# Patient Record
Sex: FEMALE | Race: WHITE | Employment: FULL TIME | URBAN - METROPOLITAN AREA
[De-identification: names, ages, dates, MRNs, and addresses within clinical notes are randomized per-mention and may not be internally consistent; named-entity substitution may affect disease eponyms.]

---

## 2017-01-20 ENCOUNTER — TRANSFERRED RECORDS (OUTPATIENT)
Dept: HEALTH INFORMATION MANAGEMENT | Facility: CLINIC | Age: 52
End: 2017-01-20

## 2017-03-17 ENCOUNTER — TRANSFERRED RECORDS (OUTPATIENT)
Dept: HEALTH INFORMATION MANAGEMENT | Facility: CLINIC | Age: 52
End: 2017-03-17

## 2017-04-19 ENCOUNTER — TRANSFERRED RECORDS (OUTPATIENT)
Dept: HEALTH INFORMATION MANAGEMENT | Facility: CLINIC | Age: 52
End: 2017-04-19

## 2017-05-18 ENCOUNTER — OFFICE VISIT (OUTPATIENT)
Dept: URGENT CARE | Facility: URGENT CARE | Age: 52
End: 2017-05-18
Payer: COMMERCIAL

## 2017-05-18 VITALS
TEMPERATURE: 98.2 F | SYSTOLIC BLOOD PRESSURE: 165 MMHG | WEIGHT: 217.4 LBS | DIASTOLIC BLOOD PRESSURE: 104 MMHG | HEART RATE: 87 BPM

## 2017-05-18 DIAGNOSIS — K04.7 DENTAL ABSCESS: Primary | ICD-10-CM

## 2017-05-18 DIAGNOSIS — K08.89 PAIN, DENTAL: ICD-10-CM

## 2017-05-18 PROCEDURE — 99213 OFFICE O/P EST LOW 20 MIN: CPT | Performed by: NURSE PRACTITIONER

## 2017-05-18 RX ORDER — PENICILLIN V POTASSIUM 500 MG/1
500 TABLET, FILM COATED ORAL 4 TIMES DAILY
Qty: 40 TABLET | Refills: 0 | Status: SHIPPED | OUTPATIENT
Start: 2017-05-18 | End: 2017-06-09

## 2017-05-18 RX ORDER — HYDROCODONE BITARTRATE AND ACETAMINOPHEN 5; 325 MG/1; MG/1
1 TABLET ORAL EVERY 6 HOURS PRN
Qty: 4 TABLET | Refills: 0 | Status: SHIPPED | OUTPATIENT
Start: 2017-05-18 | End: 2017-06-09

## 2017-05-18 ASSESSMENT — PAIN SCALES - GENERAL: PAINLEVEL: WORST PAIN (10)

## 2017-05-18 NOTE — PROGRESS NOTES
SUBJECTIVE:  Yaneli Webster is a 51 year old female here today with tooth pain.   Has been going on for 3 day(s).   Relieving Factors:  Ice   Worse with: Hot and Cold  Symptoms have been gradual since that time.  Prior history of related problems:Yes     Past Medical, social, family histories, medications, and allergies reviewed and updated    ROS:  CONSTITUTIONAL:NEGATIVE for fever, chills, change in weight  INTEGUMENTARY/SKIN: NEGATIVE for worrisome rashes, moles or lesions  EYES: NEGATIVE for vision changes or irritation  ENT/MOUTH: See above   RESP:NEGATIVE for significant cough or SOB  CV: NEGATIVE for chest pain, palpitations or peripheral edema  GI: NEGATIVE for nausea, abdominal pain, heartburn, or change in bowel habits  MUSCULOSKELETAL: NEGATIVE for significant arthralgias or myalgia  NEURO: NEGATIVE for weakness, dizziness or paresthesias    OBJECTIVE:   Blood pressure (!) 165/104, pulse 87, temperature 98.2  F (36.8  C), temperature source Tympanic, weight 217 lb 6.4 oz (98.6 kg).  Eye exam is normal - CAMPBELL, EOMI, corneas normal.  EARS: canals clear, TM retracted not injected .  Oropharyngeal exam - Poor hygeine. Some teeth missing. Abscess noted to tooth number 26.   The neck is supple and free of adenopathy or masses, the thyroid is normal without enlargement or nodules.  Cardiovascular: negative, PMI normal. No lifts, heaves, or thrills. RRR. No murmurs, clicks gallops or rub  Respiratory: negative, Percussion normal. Good diaphragmatic excursion. Lungs clear    ASSESSMENT:    ICD-10-CM    1. Dental abscess K04.7 penicillin V potassium (VEETID) 500 MG tablet   2. Pain, dental K08.89 HYDROcodone-acetaminophen (NORCO) 5-325 MG per tablet         PLAN:  Patient Instructions   It was discovered today your blood pressure was elevated. Elevated blood pressure is defined as blood pressure greater then 140/80.   Continue to monitor your blood pressure and return for a nurse only blood pressure recheck.  You  "will be contacted by a nurse in the next two week if you a blood pressure has not been obtained.      If it remains elevated follow-up with your primary care provider or return.  Indications discussed when to go to the Emergency Department       Follow up with dentist  Tylenol 1-2 tabs po q4h prn / motrin prn    Follow-up with your primary care provider next week and as needed.    Indications for emergent return to emergency department discussed with patient, who verbalized good understanding and agreement.  Patient understands the limitations of today's evaluation.         Dental Abscess    A dental abscess is an infection of the tooth socket. It often starts with a crack or cavity in the tooth. A pocket of pus forms between the tooth and the bone. The infection causes pain and swelling of the gum, cheek, or jaw. The pain is often made worse by drinking hot or cold fluids, or biting on hard foods. Pain may be felt in the facial sinus or in the ear. A severe infection can interfere with swallowing and breathing.  Causes    Cavities    Trauma    Previous dental work  Symptoms    Pain    Swelling around the tooth or face and cheek    Redness    Bad breath    Bad taste in the mouth    Fever  You will be started on an antibiotic. However, final treatment requires drainage of the pus. This can be done by removing the tooth or performing a root canal. A root canal is done by an oral surgeon. It involves drilling an opening in the tooth to drain the pus. After the infection has healed, a crown is placed over the tooth.  Home care  The following guidelines will help you care for your abscess at home:    Avoid hot and cold foods and liquids, since your tooth may be sensitive to temperature changes.    If your tooth is chipped or cracked, or if there is a large open cavity, apply oil of cloves (available over-the-counter in drug stores) directly to the tooth to reduce pain. Some drugstores carry an over-the-counter \"toothache " "kit.\" This contains oil of cloves and a paste, which can be applied over the exposed tooth to decrease sensitivity.    Apply an ice pack (ice cubes in a plastic bag, wrapped in a towel) over the injured area for 10 to 20 minutes every 1 to 2 hours the first day for pain relief. Continue this 3 to 4 times a day until the pain and swelling goes away.    You can take acetaminophen or ibuprofen for pain, unless you were given a different pain medicine to use. (Note: If you have chronic liver or kidney disease, have ever had a stomach ulcer or gastrointestinal bleeding, or are taking blood-thinning medicines, talk with your healthcare provider before using these medicines.)    An antibiotic will be prescribed. Take it as directed until completed, even if you are feeling better sooner.  Follow-up care  Follow up as advised with a dentist or oral surgeon. Even though your pain may improve with the treatment given today, only a dentist or oral surgeon can provide full treatment for this problem.    If a culture was done, you will be notified if the treatment needs to be changed. You can call in as directed for the results.    If X-rays were taken, they will be reviewed by a specialist. You will be notified of the results, especially if they affect treatment.  Call 911  Call emergency services right away if any of these occur:    Trouble breathing or swallowing, or wheezing    Hoarse voice or trouble speaking    Confusion    Extreme drowsiness or trouble awakening    Fainting or loss of consciousness    Rapid heart rate  When to seek medical advice  Call your healthcare provider right away if any of these occur:    Your face or eyelid becomes swollen or red.    Pain worsens or spreads to the neck.    You develop a fever of 100.4 F (38 C) or higher.    You have unusual drowsiness, a headache or stiff neck, or weakness.    Pus drains from the gum or tooth.    You are unable to open your mouth wide.    5267-2957 [employer] " "[employer]. [address] [place] [address]. All rights reserved. This information is not intended as a substitute for professional medical care. Always follow your healthcare professional's instructions.         *DENTAL PAIN    A crack or cavity in the tooth, which exposes the sensitive inner area of the tooth can cause tooth pain. An infection in the gum or the root of the tooth can cause pain and swelling. The pain is often made worse by drinking hot or cold fluids, or biting on hard foods. Pain may spread from the tooth to the ear or jaw on the same side.  HOME CARE:  1. Avoid hot and cold foods and liquids since your tooth may be sensitive to temperature changes.  2. If your tooth is chipped or cracked, or if there is a large open cavity, apply OIL OF CLOVES (available over-the-counter in drug stores) directly to the tooth to reduce pain. Some pharmacies carry an over-the-counter \"toothache kit.\" This contains a paste, which can be applied over the exposed tooth to decrease sensitivity. This is only a temporary solution. See a dentist as soon as possible to fix the tooth.  3. A cold pack on your jaw over the sore area may help reduce pain.  4. You may use acetaminophen (Tylenol) 650-1000 mg every 6 hours or ibuprofen (Motrin, Advil) 600 mg every 6-8 hours with food to control pain, if you are able to take these medicines. [ NOTE: If you have chronic liver or kidney disease or ever had a stomach ulcer or GI bleeding, talk with your doctor before using these medicines.]  5. If you have signs of an infection, an antibiotic will be given. Take it as directed.  FOLLOW-UP as directed with a dentist. Your pain may go away with the treatment given. However, only a dentist can fully evaluate and treat the cause and prevent the pain from coming back again.  TOOTHACHE IS A SIGN OF DISEASE IN YOUR TOOTH AND SHOULD BE EXAMINED AND TREATED BY A DENTIST.  GET PROMPT MEDICAL ATTENTION if any of the following " occur:    Your face becomes swollen or red    Pain worsens or spreads to the neck    Fever over 101  F (38.3  C)    Unusual drowsiness; headache or stiff neck; weakness or fainting    Pus drains from the tooth    Difficulty swallowing or breathing       6460-6921 [facility], [address] [place] [address]. All rights reserved. This information is not intended as a substitute for professional medical care. Always follow your healthcare professional's instructions.          DEMARCO Ellis CNP

## 2017-05-18 NOTE — NURSING NOTE
Chief Complaint   Patient presents with     Mouth Problem       Initial BP (!) 165/104 (BP Location: Right arm, Patient Position: Chair, Cuff Size: Adult Large)  Pulse 87  Temp 98.2  F (36.8  C) (Tympanic)  Wt 217 lb 6.4 oz (98.6 kg) There is no height or weight on file to calculate BMI.  Medication Reconciliation: complete    Health Maintenance that is potentially due pending provider review:  Mammogram, Pap Smear and Colonoscopy/FIT    Tram GOMEZ CMA

## 2017-05-18 NOTE — MR AVS SNAPSHOT
After Visit Summary   5/18/2017    Yaneli Webster    MRN: [id number]           Patient Information     Date Of Birth          1965        Visit Information        Provider Department      5/18/2017 5:20 PM Marina Downing APRN [facility]        Today's Diagnoses     Dental abscess    -  1    Pain, dental          Care Instructions    It was discovered today your blood pressure was elevated. Elevated blood pressure is defined as blood pressure greater then 140/80.   Continue to monitor your blood pressure and return for a nurse only blood pressure recheck.  You will be contacted by a nurse in the next two week if you a blood pressure has not been obtained.      If it remains elevated follow-up with your primary care provider or return.  Indications discussed when to go to the Emergency Department       Follow up with dentist  Tylenol 1-2 tabs po q4h prn / motrin prn    Follow-up with your primary care provider next week and as needed.    Indications for emergent return to emergency department discussed with patient, who verbalized good understanding and agreement.  Patient understands the limitations of today's evaluation.         Dental Abscess    A dental abscess is an infection of the tooth socket. It often starts with a crack or cavity in the tooth. A pocket of pus forms between the tooth and the bone. The infection causes pain and swelling of the gum, cheek, or jaw. The pain is often made worse by drinking hot or cold fluids, or biting on hard foods. Pain may be felt in the facial sinus or in the ear. A severe infection can interfere with swallowing and breathing.  Causes    Cavities    Trauma    Previous dental work  Symptoms    Pain    Swelling around the tooth or face and cheek    Redness    Bad breath    Bad taste in the mouth    Fever  You will be started on an antibiotic. However, final treatment requires drainage of the pus. This can be done by  "removing the tooth or performing a root canal. A root canal is done by an oral surgeon. It involves drilling an opening in the tooth to drain the pus. After the infection has healed, a crown is placed over the tooth.  Home care  The following guidelines will help you care for your abscess at home:    Avoid hot and cold foods and liquids, since your tooth may be sensitive to temperature changes.    If your tooth is chipped or cracked, or if there is a large open cavity, apply oil of cloves (available over-the-counter in drug stores) directly to the tooth to reduce pain. Some [employer] carry an over-the-counter \"toothache kit.\" This contains oil of cloves and a paste, which can be applied over the exposed tooth to decrease sensitivity.    Apply an ice pack (ice cubes in a plastic bag, wrapped in a towel) over the injured area for 10 to 20 minutes every 1 to 2 hours the first day for pain relief. Continue this 3 to 4 times a day until the pain and swelling goes away.    You can take acetaminophen or ibuprofen for pain, unless you were given a different pain medicine to use. (Note: If you have chronic liver or kidney disease, have ever had a stomach ulcer or gastrointestinal bleeding, or are taking blood-thinning medicines, talk with your healthcare provider before using these medicines.)    An antibiotic will be prescribed. Take it as directed until completed, even if you are feeling better sooner.  Follow-up care  Follow up as advised with a dentist or oral surgeon. Even though your pain may improve with the treatment given today, only a dentist or oral surgeon can provide full treatment for this problem.    If a culture was done, you will be notified if the treatment needs to be changed. You can call in as directed for the results.    If X-rays were taken, they will be reviewed by a specialist. You will be notified of the results, especially if they affect treatment.  Call 911  Call emergency services right away if " "any of these occur:    Trouble breathing or swallowing, or wheezing    Hoarse voice or trouble speaking    Confusion    Extreme drowsiness or trouble awakening    Fainting or loss of consciousness    Rapid heart rate  When to seek medical advice  Call your healthcare provider right away if any of these occur:    Your face or eyelid becomes swollen or red.    Pain worsens or spreads to the neck.    You develop a fever of 100.4 F (38 C) or higher.    You have unusual drowsiness, a headache or stiff neck, or weakness.    Pus drains from the gum or tooth.    You are unable to open your mouth wide.    2658-7652 [employer]. [address] [place] [address]. All rights reserved. This information is not intended as a substitute for professional medical care. Always follow your healthcare professional's instructions.         *DENTAL PAIN    A crack or cavity in the tooth, which exposes the sensitive inner area of the tooth can cause tooth pain. An infection in the gum or the root of the tooth can cause pain and swelling. The pain is often made worse by drinking hot or cold fluids, or biting on hard foods. Pain may spread from the tooth to the ear or jaw on the same side.  HOME CARE:  1. Avoid hot and cold foods and liquids since your tooth may be sensitive to temperature changes.  2. If your tooth is chipped or cracked, or if there is a large open cavity, apply OIL OF CLOVES (available over-the-counter in drug stores) directly to the tooth to reduce pain. Some pharmacies carry an over-the-counter \"toothache kit.\" This contains a paste, which can be applied over the exposed tooth to decrease sensitivity. This is only a temporary solution. See a dentist as soon as possible to fix the tooth.  3. A cold pack on your jaw over the sore area may help reduce pain.  4. You may use acetaminophen (Tylenol) 650-1000 mg every 6 hours or ibuprofen (Motrin, Advil) 600 mg every 6-8 hours with food to control pain, if " you are able to take these medicines. [ NOTE: If you have chronic liver or kidney disease or ever had a stomach ulcer or GI bleeding, talk with your doctor before using these medicines.]  5. If you have signs of an infection, an antibiotic will be given. Take it as directed.  FOLLOW-UP as directed with a dentist. Your pain may go away with the treatment given. However, only a dentist can fully evaluate and treat the cause and prevent the pain from coming back again.  TOOTHACHE IS A SIGN OF DISEASE IN YOUR TOOTH AND SHOULD BE EXAMINED AND TREATED BY A DENTIST.  GET PROMPT MEDICAL ATTENTION if any of the following occur:    Your face becomes swollen or red    Pain worsens or spreads to the neck    Fever over 101  F (38.3  C)    Unusual drowsiness; headache or stiff neck; weakness or fainting    Pus drains from the tooth    Difficulty swallowing or breathing       2933-2762 [facility], [address] [place] [address]. All rights reserved. This information is not intended as a substitute for professional medical care. Always follow your healthcare professional's instructions.          Follow-ups after your visit        Who to contact     If you have questions or need follow up information about today's clinic visit or your schedule please contact [facility] directly at [phone].  Normal or non-critical lab and imaging results will be communicated to you by [employer]orProhart, letter or phone within 4 business days after the clinic has received the results. If you do not hear from us within 7 days, please contact the clinic through [employer]orProhart or phone. If you have a critical or abnormal lab result, we will notify you by phone as soon as possible.  Submit refill requests through [employer]R or call your pharmacy and they will forward the refill request to us. Please allow 3 business days for your refill to be completed.          Additional Information About Your Visit        [employer]  "Information     [employer] lets you send messages to your doctor, view your test results, renew your prescriptions, schedule appointments and more. To sign up, go to [email or web address] . Click on \"Log in\" on the left side of the screen, which will take you to the Welcome page. Then click on \"Sign up Now\" on the right side of the page.     You will be asked to enter the access code listed below, as well as some personal information. Please follow the directions to create your username and password.     Your access code is: [email or web address]  Expires: 2017  5:35 PM     Your access code will  in 90 days. If you need help or a new code, please call your [facility] clinic or [phone].        Care EveryWhere ID     This is your Care EveryWhere ID. This could be used by other organizations to access your [facility] medical records  [id number]        Your Vitals Were     Pulse Temperature                87 98.2  F (36.8  C) (Tympanic)           Blood Pressure from Last 3 Encounters:   17 (!) 165/104    Weight from Last 3 Encounters:   17 217 lb 6.4 oz (98.6 kg)              Today, you had the following     No orders found for display         Today's Medication Changes          These changes are accurate as of: 17  5:35 PM.  If you have any questions, ask your nurse or doctor.               Start taking these medicines.        Dose/Directions    HYDROcodone-acetaminophen 5-325 MG per tablet   Commonly known as:  NORCO   Used for:  Pain, dental   Started by:  Marina Downing APRN CNP        Dose:  1 tablet   Take 1 tablet by mouth every 6 hours as needed for moderate to severe pain maximum 4 tablet(s) per day   Quantity:  4 tablet   Refills:  0       penicillin V potassium 500 MG tablet   Commonly known as:  VEETID   Used for:  Dental abscess   Started by:  Marina Downing APRN CNP        Dose:  500 mg   Take 1 tablet (500 mg) by mouth 4 times daily   Quantity:  40 tablet   Refills:  0          "   Where to get your medicines      These medications were sent to [facility], [place] - [address]  [address] [place] [address]     Phone:  [phone]     penicillin V potassium 500 MG tablet         Some of these will need a paper prescription and others can be bought over the counter.  Ask your nurse if you have questions.     Bring a paper prescription for each of these medications     HYDROcodone-acetaminophen 5-325 MG per tablet                Primary Care Provider Office Phone # Fax #    One[facility] Ny Boyer [phone]       [facility] [address]  [facility] [address]        Thank you!     Thank you for choosing [facility]  for your care. Our goal is always to provide you with excellent care. Hearing back from our patients is one way we can continue to improve our services. Please take a few minutes to complete the written survey that you may receive in the mail after your visit with us. Thank you!             Your Updated Medication List - Protect others around you: Learn how to safely use, store and throw away your medicines at www.disposemymeds.org.          This list is accurate as of: 5/18/17  5:35 PM.  Always use your most recent med list.                   Brand Name Dispense Instructions for use    HYDROcodone-acetaminophen 5-325 MG per tablet    NORCO    4 tablet    Take 1 tablet by mouth every 6 hours as needed for moderate to severe pain maximum 4 tablet(s) per day       penicillin V potassium 500 MG tablet    VEETID    40 tablet    Take 1 tablet (500 mg) by mouth 4 times daily

## 2017-05-18 NOTE — PATIENT INSTRUCTIONS
It was discovered today your blood pressure was elevated. Elevated blood pressure is defined as blood pressure greater then 140/80.   Continue to monitor your blood pressure and return for a nurse only blood pressure recheck.  You will be contacted by a nurse in the next two week if you a blood pressure has not been obtained.      If it remains elevated follow-up with your primary care provider or return.  Indications discussed when to go to the Emergency Department       Follow up with dentist  Tylenol 1-2 tabs po q4h prn / motrin prn    Follow-up with your primary care provider next week and as needed.    Indications for emergent return to emergency department discussed with patient, who verbalized good understanding and agreement.  Patient understands the limitations of today's evaluation.         Dental Abscess    A dental abscess is an infection of the tooth socket. It often starts with a crack or cavity in the tooth. A pocket of pus forms between the tooth and the bone. The infection causes pain and swelling of the gum, cheek, or jaw. The pain is often made worse by drinking hot or cold fluids, or biting on hard foods. Pain may be felt in the facial sinus or in the ear. A severe infection can interfere with swallowing and breathing.  Causes    Cavities    Trauma    Previous dental work  Symptoms    Pain    Swelling around the tooth or face and cheek    Redness    Bad breath    Bad taste in the mouth    Fever  You will be started on an antibiotic. However, final treatment requires drainage of the pus. This can be done by removing the tooth or performing a root canal. A root canal is done by an oral surgeon. It involves drilling an opening in the tooth to drain the pus. After the infection has healed, a crown is placed over the tooth.  Home care  The following guidelines will help you care for your abscess at home:    Avoid hot and cold foods and liquids, since your tooth may be sensitive to temperature  "changes.    If your tooth is chipped or cracked, or if there is a large open cavity, apply oil of cloves (available over-the-counter in drug stores) directly to the tooth to reduce pain. Some drugstores carry an over-the-counter \"toothache kit.\" This contains oil of cloves and a paste, which can be applied over the exposed tooth to decrease sensitivity.    Apply an ice pack (ice cubes in a plastic bag, wrapped in a towel) over the injured area for 10 to 20 minutes every 1 to 2 hours the first day for pain relief. Continue this 3 to 4 times a day until the pain and swelling goes away.    You can take acetaminophen or ibuprofen for pain, unless you were given a different pain medicine to use. (Note: If you have chronic liver or kidney disease, have ever had a stomach ulcer or gastrointestinal bleeding, or are taking blood-thinning medicines, talk with your healthcare provider before using these medicines.)    An antibiotic will be prescribed. Take it as directed until completed, even if you are feeling better sooner.  Follow-up care  Follow up as advised with a dentist or oral surgeon. Even though your pain may improve with the treatment given today, only a dentist or oral surgeon can provide full treatment for this problem.    If a culture was done, you will be notified if the treatment needs to be changed. You can call in as directed for the results.    If X-rays were taken, they will be reviewed by a specialist. You will be notified of the results, especially if they affect treatment.  Call 911  Call emergency services right away if any of these occur:    Trouble breathing or swallowing, or wheezing    Hoarse voice or trouble speaking    Confusion    Extreme drowsiness or trouble awakening    Fainting or loss of consciousness    Rapid heart rate  When to seek medical advice  Call your healthcare provider right away if any of these occur:    Your face or eyelid becomes swollen or red.    Pain worsens or spreads to " "the neck.    You develop a fever of 100.4 F (38 C) or higher.    You have unusual drowsiness, a headache or stiff neck, or weakness.    Pus drains from the gum or tooth.    You are unable to open your mouth wide.    9248-0533 [employer]. [address] [place] [address]. All rights reserved. This information is not intended as a substitute for professional medical care. Always follow your healthcare professional's instructions.         *DENTAL PAIN    A crack or cavity in the tooth, which exposes the sensitive inner area of the tooth can cause tooth pain. An infection in the gum or the root of the tooth can cause pain and swelling. The pain is often made worse by drinking hot or cold fluids, or biting on hard foods. Pain may spread from the tooth to the ear or jaw on the same side.  HOME CARE:  1. Avoid hot and cold foods and liquids since your tooth may be sensitive to temperature changes.  2. If your tooth is chipped or cracked, or if there is a large open cavity, apply OIL OF CLOVES (available over-the-counter in drug stores) directly to the tooth to reduce pain. Some pharmacies carry an over-the-counter \"toothache kit.\" This contains a paste, which can be applied over the exposed tooth to decrease sensitivity. This is only a temporary solution. See a dentist as soon as possible to fix the tooth.  3. A cold pack on your jaw over the sore area may help reduce pain.  4. You may use acetaminophen (Tylenol) 650-1000 mg every 6 hours or ibuprofen (Motrin, Advil) 600 mg every 6-8 hours with food to control pain, if you are able to take these medicines. [ NOTE: If you have chronic liver or kidney disease or ever had a stomach ulcer or GI bleeding, talk with your doctor before using these medicines.]  5. If you have signs of an infection, an antibiotic will be given. Take it as directed.  FOLLOW-UP as directed with a dentist. Your pain may go away with the treatment given. However, only a dentist " can fully evaluate and treat the cause and prevent the pain from coming back again.  TOOTHACHE IS A SIGN OF DISEASE IN YOUR TOOTH AND SHOULD BE EXAMINED AND TREATED BY A DENTIST.  GET PROMPT MEDICAL ATTENTION if any of the following occur:    Your face becomes swollen or red    Pain worsens or spreads to the neck    Fever over 101  F (38.3  C)    Unusual drowsiness; headache or stiff neck; weakness or fainting    Pus drains from the tooth    Difficulty swallowing or breathing       [id number] [address] [address]. All rights reserved. This information is not intended as a substitute for professional medical care. Always follow your healthcare professional's instructions.

## 2017-05-23 ENCOUNTER — RADIANT APPOINTMENT (OUTPATIENT)
Dept: GENERAL RADIOLOGY | Facility: CLINIC | Age: 52
End: 2017-05-23
Attending: NURSE PRACTITIONER
Payer: COMMERCIAL

## 2017-05-23 ENCOUNTER — OFFICE VISIT (OUTPATIENT)
Dept: FAMILY MEDICINE | Facility: CLINIC | Age: 52
End: 2017-05-23
Payer: COMMERCIAL

## 2017-05-23 VITALS
OXYGEN SATURATION: 95 % | SYSTOLIC BLOOD PRESSURE: 124 MMHG | HEART RATE: 87 BPM | TEMPERATURE: 98.6 F | DIASTOLIC BLOOD PRESSURE: 88 MMHG | WEIGHT: 216 LBS

## 2017-05-23 DIAGNOSIS — Z12.39 BREAST CANCER SCREENING: ICD-10-CM

## 2017-05-23 DIAGNOSIS — M25.561 CHRONIC PAIN OF RIGHT KNEE: Primary | ICD-10-CM

## 2017-05-23 DIAGNOSIS — G89.29 CHRONIC PAIN OF RIGHT KNEE: ICD-10-CM

## 2017-05-23 DIAGNOSIS — M25.561 CHRONIC PAIN OF RIGHT KNEE: ICD-10-CM

## 2017-05-23 DIAGNOSIS — F41.9 ANXIETY: ICD-10-CM

## 2017-05-23 DIAGNOSIS — Z12.11 COLON CANCER SCREENING: ICD-10-CM

## 2017-05-23 DIAGNOSIS — G89.29 CHRONIC PAIN OF RIGHT KNEE: Primary | ICD-10-CM

## 2017-05-23 PROCEDURE — 73560 X-RAY EXAM OF KNEE 1 OR 2: CPT | Mod: RT

## 2017-05-23 PROCEDURE — 99214 OFFICE O/P EST MOD 30 MIN: CPT | Performed by: NURSE PRACTITIONER

## 2017-05-23 RX ORDER — LORAZEPAM 1 MG/1
1 TABLET ORAL ONCE
Qty: 1 TABLET | Refills: 0 | Status: SHIPPED | OUTPATIENT
Start: 2017-05-23 | End: 2017-05-23

## 2017-05-23 NOTE — Clinical Note
Please call patient to schedule appointment with me in a month. Ask her to bring in her Rx so we can document doses.

## 2017-05-23 NOTE — NURSING NOTE
Chief Complaint   Patient presents with     Musculoskeletal Problem       Initial /88  Pulse 87  Temp 98.6  F (37  C) (Tympanic)  Wt 216 lb (98 kg)  SpO2 95% There is no height or weight on file to calculate BMI.  Medication Reconciliation: complete    Health Maintenance that is potentially due pending provider review:  Mammogram, Pap Smear and Colonoscopy/FIT    Gave pt phone number/pended order to schedule mammo and/or colonoscopy(or FIT)

## 2017-05-23 NOTE — MR AVS SNAPSHOT
After Visit Summary   5/23/2017    Yaneli Webster    MRN: [id number]           Patient Information     Date Of Birth          1965        Visit Information        Provider Department      5/23/2017 4:20 PM Marisel Dang APRN [facility]        Today's Diagnoses     Chronic pain of right knee    -  1    Breast cancer screening        Colon cancer screening        Anxiety          Care Instructions    Schedule your MRI, mammogram and colonoscopy in [place].    We will call you with the results of your x ray.    Continue icing your knee.    Wear your knee brace for support.         Follow-ups after your visit        Additional Services     GASTROENTEROLOGY ADULT REF PROCEDURE ONLY       Last Lab Result: No results found for: CR  There is no height or weight on file to calculate BMI.     Needed:  No  Language:  English    Patient will be contacted to schedule procedure.     Please be aware that coverage of these services is subject to the terms and limitations of your health insurance plan.  Call member services at your health plan with any benefit or coverage questions.  Any procedures must be performed at a [facility] facility OR coordinated by your clinic's referral office.    Please bring the following with you to your appointment:    (1) Any X-Rays, CTs or MRIs which have been performed.  Contact the facility where they were done to arrange for  prior to your scheduled appointment.    (2) List of current medications   (3) This referral request   (4) Any documents/labs given to you for this referral                  Future tests that were ordered for you today     Open Future Orders        Priority Expected Expires Ordered    MA Diagnostic Digital Bilateral Routine  5/23/2018 5/23/2017    MR Knee Right w/o Contrast Routine  5/23/2018 5/23/2017            Who to contact     If you have questions or need follow up information about today's clinic visit or  your schedule please contact [facility] directly at [phone].  Normal or non-critical lab and imaging results will be communicated to you by MyChart, letter or phone within 4 business days after the clinic has received the results. If you do not hear from us within 7 days, please contact the clinic through MyChart or phone. If you have a critical or abnormal lab result, we will notify you by phone as soon as possible.  Submit refill requests through [employer] or call your pharmacy and they will forward the refill request to us. Please allow 3 business days for your refill to be completed.          Additional Information About Your Visit        Care EveryWhere ID     This is your Care EveryWhere ID. This could be used by other organizations to access your [place] medical records  [id number]        Your Vitals Were     Pulse Temperature Pulse Oximetry             87 98.6  F (37  C) (Tympanic) 95%          Blood Pressure from Last 3 Encounters:   05/23/17 124/88   05/18/17 (!) 165/104    Weight from Last 3 Encounters:   05/23/17 216 lb (98 kg)   05/18/17 217 lb 6.4 oz (98.6 kg)              We Performed the Following     GASTROENTEROLOGY ADULT REF PROCEDURE ONLY          Today's Medication Changes          These changes are accurate as of: 5/23/17  5:35 PM.  If you have any questions, ask your nurse or doctor.               Start taking these medicines.        Dose/Directions    LORazepam 1 MG tablet   Commonly known as:  ATIVAN   Used for:  Anxiety   Started by:  Marisel Dang APRN CNP        Dose:  1 mg   Take 1 tablet (1 mg) by mouth once for 1 dose prior to the MRI   Quantity:  1 tablet   Refills:  0            Where to get your medicines      Some of these will need a paper prescription and others can be bought over the counter.  Ask your nurse if you have questions.     Bring a paper prescription for each of these medications     LORazepam 1 MG tablet                Primary Care  Provider Office Phone # Fax #    [facility] Boyer [phone]       [facility] [address]  [facility] [address]        Thank you!     Thank you for choosing [facility]  for your care. Our goal is always to provide you with excellent care. Hearing back from our patients is one way we can continue to improve our services. Please take a few minutes to complete the written survey that you may receive in the mail after your visit with us. Thank you!             Your Updated Medication List - Protect others around you: Learn how to safely use, store and throw away your medicines at www.disposemymeds.org.          This list is accurate as of: 5/23/17  5:35 PM.  Always use your most recent med list.                   Brand Name Dispense Instructions for use    HYDROCHLOROTHIAZIDE PO          HYDROcodone-acetaminophen 5-325 MG per tablet    NORCO    4 tablet    Take 1 tablet by mouth every 6 hours as needed for moderate to severe pain maximum 4 tablet(s) per day       LORazepam 1 MG tablet    ATIVAN    1 tablet    Take 1 tablet (1 mg) by mouth once for 1 dose prior to the MRI       * LOSARTAN POTASSIUM PO          * COZAAR PO          METOPROLOL TARTRATE PO          OMEPRAZOLE PO          penicillin V potassium 500 MG tablet    VEETID    40 tablet    Take 1 tablet (500 mg) by mouth 4 times daily       * Notice:  This list has 2 medication(s) that are the same as other medications prescribed for you. Read the directions carefully, and ask your doctor or other care provider to review them with you.

## 2017-05-23 NOTE — PROGRESS NOTES
SUBJECTIVE:                                                    Yaneli Webster is a 51 year old female who presents to clinic today for the following health issues:    Looking for primary provider, moved to area recently    Musculoskeletal problem/pain      Duration: 3 weeks    Description  Location: right knee    Intensity:  moderate    Accompanying signs and symptoms: numbness, tingling and swelling    History -    Had meniscus repair 3 months ago  Previous similar problem: no   Previous evaluation:  x-ray and MRI    Precipitating or alleviating factors:  Trauma or overuse: no   Aggravating factors include: standing, walking, climbing stairs and overuse    Therapies tried and outcome: rest/inactivity, heat, ice, stretching, exercises, acetaminophen and NSAID - ibuprofren     Right leg pain with sciatic pain. Started shortly after surgery. Felt good for about 2 weeks after surgery which was 3 months ago    Past medical history per patient report:  - Hypertension  On metoprolol, losartan, HCTZ. Does not know doses.  BP Readings from Last 6 Encounters:   05/23/17 124/88   05/18/17 (!) 165/104       Hyperlipidemia    Depression- was on antidepressant but changed to anxiety med. Started on strattera but had a rash on face and scalp    Trying to quit smoking - reacted to patch, stopped Chantix and     Partially hyst with bladder sling    Partial left knee repl due to avascular necrosis- Dr. Singh [facility] about 8 years ago. Retired now. Has seen 2 or 3 different ortho- scopes by other MDs. Meniscus and ACL tears.   Injections for discs in low back (5-6 months ago) and neck. Wants to put off surgery. [facility].    Health maintenance needs:  Mammogram, colonoscopy    Needs new upper and lower partial.      Problem list and histories reviewed & adjusted, as indicated.  Additional history: as documented      Reviewed and updated as needed this visit by clinical staff  Tobacco  Allergies       Reviewed and updated  as needed this visit by Provider         ROS:  Constitutional, HEENT, cardiovascular, pulmonary, gi and gu systems are negative, except as otherwise noted.    OBJECTIVE:                                                    /88  Pulse 87  Temp 98.6  F (37  C) (Tympanic)  Wt 216 lb (98 kg)  SpO2 95%  There is no height or weight on file to calculate BMI.  GENERAL: healthy, alert and no distress  NECK: no adenopathy, no asymmetry, masses, or scars and thyroid normal to palpation  RESP: lungs clear to auscultation - no rales, rhonchi or wheezes  CV: regular rate and rhythm, normal S1 S2, no S3 or S4, no murmur, click or rub, no peripheral edema and peripheral pulses strong  MS: tenderness to palpation right knee. No effusion. Has difficulty going from sit to stand due to pain  PSYCH: mentation appears normal, affect normal/bright    Diagnostic Test Results:  No results found for this or any previous visit.     ASSESSMENT/PLAN:                                                        1. Chronic pain of right knee  Last XR found on Care Everywhere in 2002 was unremarkable. No MRI noted in past records. Recommend XR and MRI. Further plan pending results. Patient agrees  - XR Knee Standing AP Bilat & Lateral Right; Future  - MR Knee Right w/o Contrast; Future    2. Breast cancer screening  - MA Diagnostic Digital Bilateral; Future    3. Colon cancer screening  - GASTROENTEROLOGY ADULT REF PROCEDURE ONLY    4. Anxiety  Becomes anxious with MRI. Will provide one time dose of Lorazepam  - LORazepam (ATIVAN) 1 MG tablet; Take 1 tablet (1 mg) by mouth once for 1 dose prior to the MRI  Dispense: 1 tablet; Refill: 0    5. Hypertension  Elevated reading on 5/18/17 was day she was being seen for dental abscess. In normal range today.  On metoprolol, losartan, HCTZ. Does not know doses.    6. Hyperlipidemia  Per patient report    7. Depression-  Was on antidepressant but changed to anxiety med. Is not on any medication  presently    8. History Partially hyst with bladder sling    9. Partial left knee repl due to avascular necrosis  Surgery in 2007 or 2008.    Patient Instructions   Schedule your MRI, mammogram and colonoscopy in [place].    We will call you with the results of your x ray.    Continue icing your knee.    Wear your knee brace for support.       Marisel Dang NP, APRN CNP  [facility]

## 2017-05-23 NOTE — PATIENT INSTRUCTIONS
Schedule your MRI, mammogram and colonoscopy in [place].    We will call you with the results of your x ray.    Continue icing your knee.    Wear your knee brace for support.

## 2017-05-23 NOTE — Clinical Note
Please update problem list in Epic   Please call patient to schedule appointment- needs pap and general physical. Remind her to bring in all Rx bottles.

## 2017-05-24 ENCOUNTER — TELEPHONE (OUTPATIENT)
Dept: FAMILY MEDICINE | Facility: CLINIC | Age: 52
End: 2017-05-24

## 2017-05-24 NOTE — LETTER
[facility]  [address] [facility] [address]  Phone: [phone]    May 30, 2017        Yaneli Webster  [address] [facility] [address]          Dear Yaneli,    We have been unable to reach you by phone.  Please check the number at your next visit.     IMAGING RESULTS:     The results of your recent knee x-ray showed:     No acute fracture or dislocation.    Mild osteoarthritis of the right knee.     If you have any further questions or problems, please contact our office.      Sincerely,        Valarie Dang NP/ elizabeth

## 2017-05-26 ENCOUNTER — TELEPHONE (OUTPATIENT)
Dept: NURSING | Facility: CLINIC | Age: 52
End: 2017-05-26

## 2017-05-26 NOTE — TELEPHONE ENCOUNTER
Please call:      No acute fracture or dislocation. Some mild oarthritis of the right knee.  Marisel Dang RNC, NP  [facility]

## 2017-05-26 NOTE — TELEPHONE ENCOUNTER
Call Type: Triage Call    Presenting Problem: Caller wanted Xray results, gave per epic.  Triage Note:  Guideline Title: Information Only Call; No Symptom Triage (Adult)  Recommended Disposition: Provide Information or Advice Only  Original Inclination: Wanted to speak with a nurse  Override Disposition:  Intended Action: Follow advice given  Physician Contacted: No  Requesting information regarding scheduled exam, test or procedure; no triage  required. Information provided from approved resources or clinical experience. ?  YES  Requesting regular office appointment ? NO  Sign(s) or symptom(s) associated with a diagnosed condition or with a new illness  ? NO  Requesting information about provider, services or community resources ? NO  Call back to complete assessment/clarification of information from prior caller to  complete triage ? NO  Requesting information and provider is best resource; no triage required. ? NO  Caller not with patient and is unable to provide clinical information about  patient to facilitate triage. ? NO  Requesting provider information for recently scheduled test, procedure; no triage  required. Needed information not available per approved resources or clinical  experience. ? NO  Requesting information not available per approved reference or clinical  experience; no triage required. ? NO  Physician Instructions:  Care Advice:

## 2017-05-27 ENCOUNTER — HEALTH MAINTENANCE LETTER (OUTPATIENT)
Age: 52
End: 2017-05-27

## 2017-06-07 ENCOUNTER — TELEPHONE (OUTPATIENT)
Dept: FAMILY MEDICINE | Facility: CLINIC | Age: 52
End: 2017-06-07

## 2017-06-07 PROBLEM — F32.A DEPRESSION: Status: ACTIVE | Noted: 2017-06-07

## 2017-06-07 NOTE — TELEPHONE ENCOUNTER
Marisel Dang APRN CNP  [facility]                     Please call patient to schedule appointment with me in a month. Ask her to bring in her Rx so we can document doses.         Marisel Dang APRN CNP  [facility] [place]                         Please call patient to schedule appointment- needs general physical. Remind her to bring in all Rx bottles.

## 2017-06-08 ENCOUNTER — HOSPITAL ENCOUNTER (OUTPATIENT)
Dept: MAMMOGRAPHY | Facility: CLINIC | Age: 52
Discharge: HOME | End: 2017-06-08
Attending: NURSE PRACTITIONER | Admitting: NURSE PRACTITIONER
Payer: COMMERCIAL

## 2017-06-08 ENCOUNTER — HOSPITAL ENCOUNTER (OUTPATIENT)
Dept: ULTRASOUND IMAGING | Facility: CLINIC | Age: 52
End: 2017-06-08
Attending: NURSE PRACTITIONER
Payer: COMMERCIAL

## 2017-06-08 ENCOUNTER — HOSPITAL ENCOUNTER (OUTPATIENT)
Dept: MRI IMAGING | Facility: CLINIC | Age: 52
End: 2017-06-08
Attending: NURSE PRACTITIONER
Payer: COMMERCIAL

## 2017-06-08 DIAGNOSIS — N64.52 BREAST DISCHARGE: ICD-10-CM

## 2017-06-08 DIAGNOSIS — G89.29 CHRONIC PAIN OF RIGHT KNEE: ICD-10-CM

## 2017-06-08 DIAGNOSIS — Z12.39 BREAST CANCER SCREENING: ICD-10-CM

## 2017-06-08 DIAGNOSIS — M25.561 CHRONIC PAIN OF RIGHT KNEE: ICD-10-CM

## 2017-06-08 PROCEDURE — 76642 ULTRASOUND BREAST LIMITED: CPT | Mod: 50

## 2017-06-08 PROCEDURE — G0204 DX MAMMO INCL CAD BI: HCPCS

## 2017-06-08 PROCEDURE — 73721 MRI JNT OF LWR EXTRE W/O DYE: CPT | Mod: RT

## 2017-06-09 ENCOUNTER — OFFICE VISIT (OUTPATIENT)
Dept: FAMILY MEDICINE | Facility: CLINIC | Age: 52
End: 2017-06-09
Payer: COMMERCIAL

## 2017-06-09 VITALS
WEIGHT: 218 LBS | DIASTOLIC BLOOD PRESSURE: 88 MMHG | BODY MASS INDEX: 33.04 KG/M2 | HEIGHT: 68 IN | HEART RATE: 80 BPM | SYSTOLIC BLOOD PRESSURE: 138 MMHG | TEMPERATURE: 97.2 F | RESPIRATION RATE: 16 BRPM

## 2017-06-09 DIAGNOSIS — Z00.00 HEALTH CARE MAINTENANCE: ICD-10-CM

## 2017-06-09 DIAGNOSIS — G89.29 CHRONIC PAIN OF RIGHT KNEE: ICD-10-CM

## 2017-06-09 DIAGNOSIS — Z96.652 STATUS POST TOTAL LEFT KNEE REPLACEMENT: ICD-10-CM

## 2017-06-09 DIAGNOSIS — E78.5 HYPERLIPIDEMIA, UNSPECIFIED HYPERLIPIDEMIA TYPE: ICD-10-CM

## 2017-06-09 DIAGNOSIS — I10 ESSENTIAL HYPERTENSION: ICD-10-CM

## 2017-06-09 DIAGNOSIS — S83.241D TEAR OF MEDIAL MENISCUS OF RIGHT KNEE, CURRENT, UNSPECIFIED TEAR TYPE, SUBSEQUENT ENCOUNTER: Primary | ICD-10-CM

## 2017-06-09 DIAGNOSIS — Z13.29 SCREENING FOR HYPOTHYROIDISM: ICD-10-CM

## 2017-06-09 DIAGNOSIS — M25.561 CHRONIC PAIN OF RIGHT KNEE: ICD-10-CM

## 2017-06-09 PROCEDURE — 99214 OFFICE O/P EST MOD 30 MIN: CPT | Performed by: NURSE PRACTITIONER

## 2017-06-09 RX ORDER — ATORVASTATIN CALCIUM 20 MG/1
20 TABLET, FILM COATED ORAL DAILY
COMMUNITY
End: 2017-06-14

## 2017-06-09 RX ORDER — TRAMADOL HYDROCHLORIDE 50 MG/1
50-100 TABLET ORAL EVERY 6 HOURS PRN
Qty: 90 TABLET | Refills: 0 | Status: SHIPPED | OUTPATIENT
Start: 2017-06-09 | End: 2017-07-21

## 2017-06-09 NOTE — PROGRESS NOTES
SUBJECTIVE:                                                    Yaneli Webster is a 51 year old female who presents to clinic today for the following health issues:       Musculoskeletal problem/pain - Discuss MRI      Duration: Several months    Description  Location: Right knee    Intensity:  10/10    Accompanying signs and symptoms: swelling and stiffness    History  Previous similar problem: YES  Previous evaluation:  MRI and surgery    Precipitating or alleviating factors:  Trauma or overuse: YES- overuse  Aggravating factors include: sitting, standing, walking, climbing stairs and overuse    Therapies tried and outcome: ice, PT, tylenol, ibuprofen, naproxen, surgery      Here to discuss the MRI done yesterday.  Continues to have right knee pain with numbness and tingling.   Right leg pain with sciatic pain. Started shortly after surgery- last arthroscopy on right knee done in Febr. 2017.  Has had a total of 3 arthroscopies on right knee.   Has been using knee brace when at work.    Right knee MRI on 6/8/17:  1. Changes of partial medial meniscectomy. Small tear of the body segment.  2. Changes of mild partial lateral meniscectomy. Persistent horizontal tearing  3. Prominent ACL cyst formation  4. MCL sprain  5. Lateral tibial plateau and trochlear chondromalacia  6. Synovitis and moderate effusion  7. Baker's cyst with leakage.    Would like to be seen by a different ortho service- did not like [facility]    Hyperlipidemia  On Lipitor. Unable to find lipid panel on Care Everywhere    Hypertension  On Metoprolol, HCTZ and Losartan. Does not know doses. Instructed to bring Rx bottles in for next appointment.  BP Readings from Last 6 Encounters:   06/09/17 138/88   05/23/17 124/88   05/18/17 (!) 165/104       Health maintenance  Due for physical with pap, lipid screen, colon cancer screening    Problem list and histories reviewed & adjusted, as indicated.  Additional history: as documented      Reviewed  "and updated as needed this visit by clinical staff  Tobacco  Allergies  Med Hx  Surg Hx  Fam Hx  Soc Hx      Reviewed and updated as needed this visit by Provider         ROS:  Constitutional, HEENT, cardiovascular, pulmonary, GI, , musculoskeletal, neuro, skin, endocrine and psych systems are negative, except as otherwise noted.    OBJECTIVE:                                                    /88  Pulse 80  Temp 97.2  F (36.2  C) (Tympanic)  Resp 16  Ht 5' 8\" (1.727 m)  Wt 218 lb (98.9 kg)  Breastfeeding? No  BMI 33.15 kg/m2  Body mass index is 33.15 kg/(m^2).  GENERAL: healthy, alert and no distress  EYES: Eyes grossly normal to inspection and conjunctivae and sclerae normal  NECK: no adenopathy, no asymmetry, masses, or scars and thyroid normal to palpation  RESP: lungs clear to auscultation - no rales, rhonchi or wheezes  CV: regular rate and rhythm, normal S1 S2, no S3 or S4, no murmur, click or rub, no peripheral edema and peripheral pulses strong  MS: decreased range of motion right knee, tenderness to palpation swelling of right knee  PSYCH: mentation appears normal, affect normal/bright    Diagnostic Test Results:  Results for orders placed or performed during the hospital encounter of 06/08/17   MR Knee Right w/o Contrast    Narrative    MR RIGHT KNEE WITHOUT CONTRAST  6/8/2017 9:21 AM    HISTORY:  History of right knee meniscus and ACL tears per patient  report. Pain and swelling both with weight bearing and at rest.  Surgery in about four months ago.    COMPARISON:  Compare with [facility] exam dated 3/6/2017.    TECHNIQUE:  Axial and coronal T2 with fat suppression. Coronal T1.  Sagittal dual echo T2.    FINDINGS:   Medial Meniscus: Deficiency of the posterior horn and body segment,  consistent with partial meniscectomy. There is prominent extrusion of  the residual body segment. There is a small horizontal tear of the  body segment, seen on series 7 image 12.   "     Lateral Meniscus: There is blunting at the apex of the anterior horn,  posterior horn and body segment, which presumably relates to mild  partial meniscectomy. Persistent horizontal tearing is again seen  within the posterior horn and body segment. Currently no meniscal cyst  formation is identified.       Anterior Cruciate Ligament: Again there is an abnormal appearance of  the ACL, with laxity and prominent cruciate ligament cyst formation.  Intact ligament fibers are present.    Posterior Cruciate Ligament: Unremarkable. No sprain or tear  identified.     Medial Collateral Ligament: Findings consistent with sprain and  partial-thickness tearing, new since the previous exam. The MRI  findings could relate to a grade 2 or grade 2-3 sprain.    Lateral Collateral Ligament Complex, Popliteus Tendon: The iliotibial  band, fibular collateral ligament, biceps femoris tendon, and  popliteus tendon are intact.    Osseous and Cartilaginous Structures: Grade III chondromalacia of the  lateral tibial plateau with reactive subchondral edema. There appears  to be grade II chondromalacia of the medial trochlea.    Extensor Mechanism: The quadriceps and patellar tendons are intact.  The medial and lateral patellar retinacula appear unremarkable.    Joint Space: Synovitis and moderate joint effusion. No definite loose  bodies appreciated.    Additional Findings: Baker's cyst extending about 6 cm in length;  adjacent edema suggesting recent leakage. Nonspecific subcutaneous  edema anteriorly. There is minimal soleus muscle edema, which is  nonspecific but could simply relate to recent strenuous exercise.  No  semimembranosus-tibial collateral ligament or pes anserine bursitis.       Impression    IMPRESSION:  1. Changes of partial medial meniscectomy. Small tear of the body  segment.  2. Changes of mild partial lateral meniscectomy. Persistent horizontal  tearing.  3. Prominent ACL cyst formation.  4. MCL sprain.  5. Lateral  tibial plateau and trochlear chondromalacia.  6. Synovitis and moderate effusion.  7. Baker's cyst with leakage.    REX DA SILVA MD            ASSESSMENT/PLAN:                                                      1. Tear of medial meniscus of right knee, current, unspecified tear type, subsequent encounter  Will need to see ortho. She agrees. Encouraged to not bear weight on right leg- she has crutches and a brace at home which she plans to use  - ORTHO  REFERRAL    2. Chronic pain of right knee  Discussed pain clinic referral following orthopedic assessment and treatment of right knee. Explained that I use the Pain Clinic to guide pain management plan. Patient agrees that interdisciplinary approach to pain management would be beneficial.  - traMADol (ULTRAM) 50 MG tablet; Take 1-2 tablets ( mg) by mouth every 6 hours as needed for moderate pain or severe pain maximum 8 tablet(s) per day  Dispense: 90 tablet; Refill: 0    3. Hyperlipidemia, unspecified hyperlipidemia type  - **Lipid panel reflex to direct LDL FUTURE 1yr; Future    4. Essential hypertension  - **Comprehensive metabolic panel FUTURE 14d; Future    5. Health care maintenance  Due for physical and pap. Instructed to schedule appointment      6. Screening for hypothyroidism  - **TSH with free T4 reflex FUTURE 1yr; Future    Patient Instructions     The Orthopedic department will call you to schedule your appointment. If you dont' hear from them by Tuesday, you can call then at the number below    Wear your knee brace at all times when up.    Use crutches as much as possible.    Elevate and ice your knee periodically    Anticipate a referral to the [facility] in the near future.    Schedule an appointment with me for next week for physical and pap.    Please have fasting labs drawn prior to the appointment.    Bring all of your medications with you to your appointment.          Colonoscopy  Colonoscopy is used to view the inside of  your lower digestive tract (colon and rectum). It can help screen for colon cancer and can help find the source of abdominal pain, bleeding, and changes in bowel habits. The test is usually done in the hospital on an outpatient basis. During the exam, the doctor can remove a small tissue sample ( a biopsy) for testing. Small growths, such as polyps, may also be removed during colonoscopy.     A camera attached to a flexible tube with a viewing lens is used to take video pictures.   Getting ready     Be sure to tell your doctor about any medications you take. Also tell your doctor about any health conditions you may have.    Discuss the risks of the test with your doctor. These include bleeding and bowel puncture.    Your rectum and colon must be empty for the test. So be sure to follow the diet and bowel prep instructions exactly. If you don t, the test may need to be rescheduled.    Ask your doctor whether you need to have a friend or family member prepared to drive you home after the test.     Colonoscopy provides an inside view of the entire colon.   During the test     You are given sedating (relaxing) medication through an IV line. You may be drowsy or completely asleep.    The procedure takes 30 minutes or longer.    The doctor performs a digital rectal exam to check for anal and rectal problems. The rectum is lubricated and the scope inserted.    If you are awake, you may have a feeling similar to needing to have a bowel movement. You may also feel pressure as air is pumped into the colon. It s OK to pass gas during the procedure.  After the test     You may discuss the results with your doctor right away or at a future visit.    Try to pass all the gas right after the test to help prevent bloating and cramping.    After the test, you can go back to your normal eating and other activities.  Risks and possible complications include:    Bleeding   A puncture or tear in the colon   Risks of anesthesia          2973-1106 [employer]. [address] [place] [address], [place] [address]. All rights reserved. This information is not intended as a substitute for professional medical care. Always follow your healthcare professional's instructions.            Marisel Dang NP, APRN [facility]

## 2017-06-09 NOTE — MR AVS SNAPSHOT
After Visit Summary   6/9/2017    Yaneli Webster    MRN: [id number]           Patient Information     Date Of Birth          1965        Visit Information        Provider Department      6/9/2017 1:40 PM Marisel Dang APRN [facility]        Today's Diagnoses     Tear of medial meniscus of right knee, current, unspecified tear type, subsequent encounter    -  1    Hyperlipidemia, unspecified hyperlipidemia type        Essential hypertension        Chronic pain of right knee        Health care maintenance        Screening for hypothyroidism          Care Instructions    The Orthopedic department will call you to schedule your appointment. If you dont' hear from them by Tuesday, you can call then at the number below    Wear your knee brace at all times when up.    Use crutches as much as possible.    Elevate and ice your knee periodically    Anticipate a referral to the [facility] in the near future.    Schedule an appointment with me for next week for physical and pap.    Please have fasting labs drawn prior to the appointment.    Bring all of your medications with you to your appointment.          Colonoscopy  Colonoscopy is used to view the inside of your lower digestive tract (colon and rectum). It can help screen for colon cancer and can help find the source of abdominal pain, bleeding, and changes in bowel habits. The test is usually done in the hospital on an outpatient basis. During the exam, the doctor can remove a small tissue sample ( a biopsy) for testing. Small growths, such as polyps, may also be removed during colonoscopy.     A camera attached to a flexible tube with a viewing lens is used to take video pictures.   Getting ready     Be sure to tell your doctor about any medications you take. Also tell your doctor about any health conditions you may have.    Discuss the risks of the test with your doctor. These include bleeding and bowel  puncture.    Your rectum and colon must be empty for the test. So be sure to follow the diet and bowel prep instructions exactly. If you don t, the test may need to be rescheduled.    Ask your doctor whether you need to have a friend or family member prepared to drive you home after the test.     Colonoscopy provides an inside view of the entire colon.   During the test     You are given sedating (relaxing) medication through an IV line. You may be drowsy or completely asleep.    The procedure takes 30 minutes or longer.    The doctor performs a digital rectal exam to check for anal and rectal problems. The rectum is lubricated and the scope inserted.    If you are awake, you may have a feeling similar to needing to have a bowel movement. You may also feel pressure as air is pumped into the colon. It s OK to pass gas during the procedure.  After the test     You may discuss the results with your doctor right away or at a future visit.    Try to pass all the gas right after the test to help prevent bloating and cramping.    After the test, you can go back to your normal eating and other activities.  Risks and possible complications include:    Bleeding   A puncture or tear in the colon   Risks of anesthesia         0817-3583 [employer] [address] [place] [address]. All rights reserved. This information is not intended as a substitute for professional medical care. Always follow your healthcare professional's instructions.                Follow-ups after your visit        Additional Services     ORTHO  REFERRAL       [facility] is referring you to the Orthopedic  Services at [facility].     MRI Right Knee 6/8/17:    IMPRESSION:  1. Changes of partial medial meniscectomy. Small tear of the body  segment.  2. Changes of mild partial lateral meniscectomy. Persistent horizontal  tearing.  3. Prominent ACL cyst formation.  4. MCL  sprain.  5. Lateral tibial plateau and trochlear chondromalacia.  6. Synovitis and moderate effusion.  7. Baker's cyst with leakage.      The  Representative will assist you in the coordination of your Orthopedic and Musculoskeletal Care as prescribed by your physician.    The  Representative will call you within 1 business day to help schedule your appointment, or you may contact the  Representative at:    All areas ~ [phone]     Type of Referral : Surgical / Specialist       Timeframe requested: 3 - 5 days    Coverage of these services is subject to the terms and limitations of your health insurance plan.  Please call member services at your health plan with any benefit or coverage questions.      If X-rays, CT or MRI's have been performed, please contact the facility where they were done to arrange for , prior to your scheduled appointment.  Please bring this referral request to your appointment and present it to your specialist.                  Your next 10 appointments already scheduled     Jun 22, 2017   Procedure with Chito Ordoñez MD   [facility] ([facility])    [address] [place] [id number] [phone]           The medical center is located at [address]. (between [address] and [address] in [place], four miles north of [place]).              Future tests that were ordered for you today     Open Future Orders        Priority Expected Expires Ordered    **Comprehensive metabolic panel FUTURE 14d Routine 6/16/2017 6/23/2017 6/9/2017    **Lipid panel reflex to direct LDL FUTURE 1yr Routine 5/10/2018 6/9/2018 6/9/2017    **TSH with free T4 reflex FUTURE 1yr Routine 5/10/2018 6/9/2018 6/9/2017    US Breast Bilateral Limited 1-3 Quadrants Routine  5/24/2018 5/24/2017    MA Diagnostic Bilateral w/Henry Routine  5/23/2018 5/23/2017            Who to contact     If you have questions or need follow up information about  "today's clinic visit or your schedule please contact [facility] directly at [phone].  Normal or non-critical lab and imaging results will be communicated to you by MyChart, letter or phone within 4 business days after the clinic has received the results. If you do not hear from us within 7 days, please contact the clinic through MyChart or phone. If you have a critical or abnormal lab result, we will notify you by phone as soon as possible.  Submit refill requests through [employer] or call your pharmacy and they will forward the refill request to us. Please allow 3 business days for your refill to be completed.          Additional Information About Your Visit        Care EveryWhere ID     This is your Care EveryWhere ID. This could be used by other organizations to access your [place] medical records  [id number]        Your Vitals Were     Pulse Temperature Respirations Height Breastfeeding? BMI (Body Mass Index)    80 97.2  F (36.2  C) (Tympanic) 16 5' 8\" (1.727 m) No 33.15 kg/m2       Blood Pressure from Last 3 Encounters:   06/09/17 138/88   05/23/17 124/88   05/18/17 (!) 165/104    Weight from Last 3 Encounters:   06/09/17 218 lb (98.9 kg)   05/23/17 216 lb (98 kg)   05/18/17 217 lb 6.4 oz (98.6 kg)              We Performed the Following     CBC with platelets differential     ORTHO  REFERRAL          Today's Medication Changes          These changes are accurate as of: 6/9/17  2:39 PM.  If you have any questions, ask your nurse or doctor.               Start taking these medicines.        Dose/Directions    traMADol 50 MG tablet   Commonly known as:  ULTRAM   Used for:  Chronic pain of right knee   Started by:  Marisel Dang APRN CNP        Dose:   mg   Take 1-2 tablets ( mg) by mouth every 6 hours as needed for moderate pain or severe pain maximum 8 tablet(s) per day   Quantity:  90 tablet   Refills:  0            Where to get your medicines      Some of these " will need a paper prescription and others can be bought over the counter.  Ask your nurse if you have questions.     Bring a paper prescription for each of these medications     traMADol 50 MG tablet                Primary Care Provider Office Phone # Fax #    Oneida Ny Boyer [phone]       [facility] [address]  [facility] [address]        Thank you!     Thank you for choosing [facility]  for your care. Our goal is always to provide you with excellent care. Hearing back from our patients is one way we can continue to improve our services. Please take a few minutes to complete the written survey that you may receive in the mail after your visit with us. Thank you!             Your Updated Medication List - Protect others around you: Learn how to safely use, store and throw away your medicines at www.disposemymeds.org.          This list is accurate as of: 6/9/17  2:39 PM.  Always use your most recent med list.                   Brand Name Dispense Instructions for use    atorvastatin 20 MG tablet    LIPITOR     Take 20 mg by mouth daily       HYDROCHLOROTHIAZIDE PO          * LOSARTAN POTASSIUM PO          * COZAAR PO          METOPROLOL TARTRATE PO          OMEPRAZOLE PO          traMADol 50 MG tablet    ULTRAM    90 tablet    Take 1-2 tablets ( mg) by mouth every 6 hours as needed for moderate pain or severe pain maximum 8 tablet(s) per day       * Notice:  This list has 2 medication(s) that are the same as other medications prescribed for you. Read the directions carefully, and ask your doctor or other care provider to review them with you.

## 2017-06-09 NOTE — NURSING NOTE
"Chief Complaint   Patient presents with     Knee Pain     right knee, discuss MRI       Initial /88  Pulse 80  Temp 97.2  F (36.2  C) (Tympanic)  Resp 16  Ht 5' 8\" (1.727 m)  Wt 218 lb (98.9 kg)  Breastfeeding? No  BMI 33.15 kg/m2 Estimated body mass index is 33.15 kg/(m^2) as calculated from the following:    Height as of this encounter: 5' 8\" (1.727 m).    Weight as of this encounter: 218 lb (98.9 kg).  Medication Reconciliation: complete    Health Maintenance that is potentially due pending provider review:  Pap Smear, Colonoscopy/FIT and PHQ9    Possibly completing today per provider review.    Balbina Johnson, CMA    "

## 2017-06-09 NOTE — PATIENT INSTRUCTIONS
The Orthopedic department will call you to schedule your appointment. If you dont' hear from them by Tuesday, you can call then at the number below    Wear your knee brace at all times when up.    Use crutches as much as possible.    Elevate and ice your knee periodically    Anticipate a referral to the [facility] in the near future.    Schedule an appointment with me for next week for physical and pap.    Please have fasting labs drawn prior to the appointment.    Bring all of your medications with you to your appointment.          Colonoscopy  Colonoscopy is used to view the inside of your lower digestive tract (colon and rectum). It can help screen for colon cancer and can help find the source of abdominal pain, bleeding, and changes in bowel habits. The test is usually done in the hospital on an outpatient basis. During the exam, the doctor can remove a small tissue sample ( a biopsy) for testing. Small growths, such as polyps, may also be removed during colonoscopy.     A camera attached to a flexible tube with a viewing lens is used to take video pictures.   Getting ready     Be sure to tell your doctor about any medications you take. Also tell your doctor about any health conditions you may have.    Discuss the risks of the test with your doctor. These include bleeding and bowel puncture.    Your rectum and colon must be empty for the test. So be sure to follow the diet and bowel prep instructions exactly. If you don t, the test may need to be rescheduled.    Ask your doctor whether you need to have a friend or family member prepared to drive you home after the test.     Colonoscopy provides an inside view of the entire colon.   During the test     You are given sedating (relaxing) medication through an IV line. You may be drowsy or completely asleep.    The procedure takes 30 minutes or longer.    The doctor performs a digital rectal exam to check for anal and rectal problems. The rectum is  lubricated and the scope inserted.    If you are awake, you may have a feeling similar to needing to have a bowel movement. You may also feel pressure as air is pumped into the colon. It s OK to pass gas during the procedure.  After the test     You may discuss the results with your doctor right away or at a future visit.    Try to pass all the gas right after the test to help prevent bloating and cramping.    After the test, you can go back to your normal eating and other activities.  Risks and possible complications include:    Bleeding   A puncture or tear in the colon   Risks of anesthesia         6633-6089 [employer]. [address] [place] [address], [place] [address]. All rights reserved. This information is not intended as a substitute for professional medical care. Always follow your healthcare professional's instructions.

## 2017-06-12 PROBLEM — Z96.652 STATUS POST TOTAL LEFT KNEE REPLACEMENT: Status: ACTIVE | Noted: 2017-06-09

## 2017-06-13 DIAGNOSIS — E78.5 HYPERLIPIDEMIA, UNSPECIFIED HYPERLIPIDEMIA TYPE: ICD-10-CM

## 2017-06-13 DIAGNOSIS — I10 ESSENTIAL HYPERTENSION: ICD-10-CM

## 2017-06-13 DIAGNOSIS — Z13.29 SCREENING FOR HYPOTHYROIDISM: ICD-10-CM

## 2017-06-13 LAB
ALBUMIN SERPL-MCNC: 3.8 G/DL (ref 3.4–5)
ALP SERPL-CCNC: 84 U/L (ref 40–150)
ALT SERPL W P-5'-P-CCNC: 33 U/L (ref 0–50)
ANION GAP SERPL CALCULATED.3IONS-SCNC: 7 MMOL/L (ref 3–14)
AST SERPL W P-5'-P-CCNC: 28 U/L (ref 0–45)
BILIRUB SERPL-MCNC: 0.4 MG/DL (ref 0.2–1.3)
BUN SERPL-MCNC: 10 MG/DL (ref 7–30)
CALCIUM SERPL-MCNC: 9 MG/DL (ref 8.5–10.1)
CHLORIDE SERPL-SCNC: 106 MMOL/L (ref 94–109)
CHOLEST SERPL-MCNC: 216 MG/DL
CO2 SERPL-SCNC: 26 MMOL/L (ref 20–32)
CREAT SERPL-MCNC: 0.78 MG/DL (ref 0.52–1.04)
GFR SERPL CREATININE-BSD FRML MDRD: 78 ML/MIN/1.7M2
GLUCOSE SERPL-MCNC: 85 MG/DL (ref 70–99)
HDLC SERPL-MCNC: 40 MG/DL
LDLC SERPL CALC-MCNC: 136 MG/DL
NONHDLC SERPL-MCNC: 176 MG/DL
POTASSIUM SERPL-SCNC: 4.2 MMOL/L (ref 3.4–5.3)
PROT SERPL-MCNC: 7.9 G/DL (ref 6.8–8.8)
SODIUM SERPL-SCNC: 139 MMOL/L (ref 133–144)
TRIGL SERPL-MCNC: 202 MG/DL
TSH SERPL DL<=0.005 MIU/L-ACNC: 1.32 MU/L (ref 0.4–4)

## 2017-06-13 PROCEDURE — 36415 COLL VENOUS BLD VENIPUNCTURE: CPT | Performed by: NURSE PRACTITIONER

## 2017-06-13 PROCEDURE — 80061 LIPID PANEL: CPT | Performed by: NURSE PRACTITIONER

## 2017-06-13 PROCEDURE — 84443 ASSAY THYROID STIM HORMONE: CPT | Performed by: NURSE PRACTITIONER

## 2017-06-13 PROCEDURE — 80053 COMPREHEN METABOLIC PANEL: CPT | Performed by: NURSE PRACTITIONER

## 2017-06-14 ENCOUNTER — TELEPHONE (OUTPATIENT)
Dept: FAMILY MEDICINE | Facility: CLINIC | Age: 52
End: 2017-06-14

## 2017-06-14 DIAGNOSIS — I10 BENIGN ESSENTIAL HYPERTENSION: ICD-10-CM

## 2017-06-14 DIAGNOSIS — E78.5 HYPERLIPIDEMIA LDL GOAL <100: Primary | ICD-10-CM

## 2017-06-14 DIAGNOSIS — E78.5 HYPERLIPIDEMIA, UNSPECIFIED HYPERLIPIDEMIA TYPE: Primary | ICD-10-CM

## 2017-06-14 RX ORDER — METOPROLOL SUCCINATE 25 MG/1
25 TABLET, EXTENDED RELEASE ORAL DAILY
Qty: 90 TABLET | Refills: 3 | Status: SHIPPED | OUTPATIENT
Start: 2017-06-14

## 2017-06-14 RX ORDER — LOSARTAN POTASSIUM 100 MG/1
100 TABLET ORAL DAILY
Qty: 90 TABLET | Refills: 3 | Status: SHIPPED | OUTPATIENT
Start: 2017-06-14

## 2017-06-14 RX ORDER — ATORVASTATIN CALCIUM 20 MG/1
20 TABLET, FILM COATED ORAL DAILY
Qty: 90 TABLET | Refills: 3 | Status: SHIPPED | OUTPATIENT
Start: 2017-06-14

## 2017-06-14 RX ORDER — HYDROCHLOROTHIAZIDE 25 MG/1
25 TABLET ORAL DAILY
Qty: 90 TABLET | Refills: 3 | Status: SHIPPED | OUTPATIENT
Start: 2017-06-14 | End: 2020-03-03

## 2017-06-14 RX ORDER — ATORVASTATIN CALCIUM 20 MG/1
40 TABLET, FILM COATED ORAL DAILY
Qty: 30 TABLET | COMMUNITY
Start: 2017-06-14 | End: 2017-06-14

## 2017-06-14 NOTE — TELEPHONE ENCOUNTER
As patient has not taken medications for a long time- months? Will not increase her Atorvastatin as previously instructed today. Should continue Storvastatin 20 mg dose and recheck lipid panel in 3 months. Marisel Dang RNC, NP  [facility]

## 2017-06-14 NOTE — TELEPHONE ENCOUNTER
Patient notified elevated lipids, patient has not been on any of her medications for a long time. She needs medications sent to pharmacy. Patient was waiting on [facility] to refill but they never did.     Patient found her old bottles and read me the doses. Patient would like refills to [facility] in [place] and will receheck fasting labs in 3 months.

## 2017-06-20 ENCOUNTER — ANESTHESIA EVENT (OUTPATIENT)
Dept: GASTROENTEROLOGY | Facility: CLINIC | Age: 52
End: 2017-06-20
Payer: COMMERCIAL

## 2017-06-20 ASSESSMENT — LIFESTYLE VARIABLES: TOBACCO_USE: 1

## 2017-06-20 NOTE — ANESTHESIA PREPROCEDURE EVALUATION
Anesthesia Evaluation     . Pt has had prior anesthetic. Type: General and Regional           ROS/MED HX    ENT/Pulmonary:     (+)tobacco use, Current use , . .    Neurologic:  - neg neurologic ROS     Cardiovascular:     (+) Dyslipidemia, hypertension----. : . . . :. .       METS/Exercise Tolerance:     Hematologic:         Musculoskeletal:   (+) arthritis, , , other musculoskeletal- left TKA      GI/Hepatic:     (+) GERD Asymptomatic on medication,       Renal/Genitourinary:  - ROS Renal section negative       Endo:  - neg endo ROS       Psychiatric:     (+) psychiatric history depression      Infectious Disease:  - neg infectious disease ROS       Malignancy:      - no malignancy   Other:    - neg other ROS                 Physical Exam  Normal systems: cardiovascular and pulmonary    Airway   Mallampati: II  TM distance: >3 FB  Neck ROM: full    Dental   (+) upper dentures    Cardiovascular       Pulmonary                     Anesthesia Plan      History & Physical Review  History and physical reviewed and following examination; no interval change.    ASA Status:  3 .    NPO Status:  > 8 hours    Plan for MAC and General with Propofol and Intravenous induction. Reason for MAC:  Other - see comments  PONV prophylaxis:  Ondansetron (or other 5HT-3) and Dexamethasone or Solumedrol       Postoperative Care  Postoperative pain management:  IV analgesics and Oral pain medications.      Consents  Anesthetic plan, risks, benefits and alternatives discussed with:  Patient..                          .

## 2017-06-22 ENCOUNTER — SURGERY (OUTPATIENT)
Age: 52
End: 2017-06-22

## 2017-06-22 ENCOUNTER — HOSPITAL ENCOUNTER (OUTPATIENT)
Facility: CLINIC | Age: 52
Discharge: HOME | End: 2017-06-22
Attending: SURGERY | Admitting: SURGERY
Payer: COMMERCIAL

## 2017-06-22 ENCOUNTER — ANESTHESIA (OUTPATIENT)
Dept: GASTROENTEROLOGY | Facility: CLINIC | Age: 52
End: 2017-06-22
Payer: COMMERCIAL

## 2017-06-22 VITALS
DIASTOLIC BLOOD PRESSURE: 101 MMHG | SYSTOLIC BLOOD PRESSURE: 158 MMHG | TEMPERATURE: 98.2 F | RESPIRATION RATE: 16 BRPM | HEIGHT: 68 IN | BODY MASS INDEX: 33.04 KG/M2 | WEIGHT: 218 LBS | OXYGEN SATURATION: 98 %

## 2017-06-22 LAB — COLONOSCOPY: NORMAL

## 2017-06-22 PROCEDURE — 37000008 ZZH ANESTHESIA TECHNICAL FEE, 1ST 30 MIN: Performed by: SURGERY

## 2017-06-22 PROCEDURE — 25000125 ZZHC RX 250: Performed by: NURSE ANESTHETIST, CERTIFIED REGISTERED

## 2017-06-22 PROCEDURE — 45384 COLONOSCOPY W/LESION REMOVAL: CPT | Mod: 59 | Performed by: SURGERY

## 2017-06-22 PROCEDURE — 88305 TISSUE EXAM BY PATHOLOGIST: CPT | Performed by: SURGERY

## 2017-06-22 PROCEDURE — 45384 COLONOSCOPY W/LESION REMOVAL: CPT | Mod: XU,PT

## 2017-06-22 PROCEDURE — 88305 TISSUE EXAM BY PATHOLOGIST: CPT | Mod: 26 | Performed by: SURGERY

## 2017-06-22 PROCEDURE — 25000125 ZZHC RX 250: Performed by: SURGERY

## 2017-06-22 PROCEDURE — 45385 COLONOSCOPY W/LESION REMOVAL: CPT | Performed by: SURGERY

## 2017-06-22 PROCEDURE — 45385 COLONOSCOPY W/LESION REMOVAL: CPT | Mod: PT | Performed by: SURGERY

## 2017-06-22 PROCEDURE — 25000128 H RX IP 250 OP 636: Performed by: SURGERY

## 2017-06-22 RX ORDER — ONDANSETRON 2 MG/ML
4 INJECTION INTRAMUSCULAR; INTRAVENOUS
Status: DISCONTINUED | OUTPATIENT
Start: 2017-06-22 | End: 2017-06-22 | Stop reason: HOSPADM

## 2017-06-22 RX ORDER — SODIUM CHLORIDE, SODIUM LACTATE, POTASSIUM CHLORIDE, CALCIUM CHLORIDE 600; 310; 30; 20 MG/100ML; MG/100ML; MG/100ML; MG/100ML
INJECTION, SOLUTION INTRAVENOUS CONTINUOUS
Status: DISCONTINUED | OUTPATIENT
Start: 2017-06-22 | End: 2017-06-22 | Stop reason: HOSPADM

## 2017-06-22 RX ORDER — LIDOCAINE 40 MG/G
CREAM TOPICAL
Status: DISCONTINUED | OUTPATIENT
Start: 2017-06-22 | End: 2017-06-22 | Stop reason: HOSPADM

## 2017-06-22 RX ORDER — PROPOFOL 10 MG/ML
INJECTION, EMULSION INTRAVENOUS PRN
Status: DISCONTINUED | OUTPATIENT
Start: 2017-06-22 | End: 2017-06-22

## 2017-06-22 RX ORDER — PROPOFOL 10 MG/ML
INJECTION, EMULSION INTRAVENOUS CONTINUOUS PRN
Status: DISCONTINUED | OUTPATIENT
Start: 2017-06-22 | End: 2017-06-22

## 2017-06-22 RX ADMIN — SODIUM CHLORIDE, POTASSIUM CHLORIDE, SODIUM LACTATE AND CALCIUM CHLORIDE: 600; 310; 30; 20 INJECTION, SOLUTION INTRAVENOUS at 08:52

## 2017-06-22 RX ADMIN — PROPOFOL 200 MCG/KG/MIN: 10 INJECTION, EMULSION INTRAVENOUS at 09:41

## 2017-06-22 RX ADMIN — PROPOFOL 100 MG: 10 INJECTION, EMULSION INTRAVENOUS at 09:43

## 2017-06-22 RX ADMIN — LIDOCAINE HYDROCHLORIDE 1 ML: 10 INJECTION, SOLUTION EPIDURAL; INFILTRATION; INTRACAUDAL; PERINEURAL at 08:52

## 2017-06-22 NOTE — ANESTHESIA CARE TRANSFER NOTE
Patient: Yaneli Webster    Procedure(s):  Colonoscopy - Wound Class: II-Clean Contaminated    Diagnosis: screening  Diagnosis Additional Information: No value filed.    Anesthesia Type:   MAC, General     Note:    Patient transferred to:Phase II        Vitals: (Last set prior to Anesthesia Care Transfer)    CRNA VITALS  6/22/2017 0925 - 6/22/2017 0955      6/22/2017             Pulse: 93    Ht Rate: 94    SpO2: 95 %                Electronically Signed By: DEMARCO Bosch CRNA  June 22, 2017  9:55 AM

## 2017-06-22 NOTE — H&P
"51 year old year old female here for colonoscopy for screening.    Patient Active Problem List   Diagnosis     Depression     Osteoarthritis of knee     Hypertension     Hyperlipidemia     Tobacco use     Status post total left knee replacement       History reviewed. No pertinent past medical history.    Past Surgical History:   Procedure Laterality Date     ARTHROSCOPY KNEE RT/LT Left 2010     ARTHROSCOPY KNEE WITH MENISCAL REPAIR Right 2017     HYSTERECTOMY, CERVIX STATUS UNKNOWN      with bladder sling     total knee Left 01/05/2012     TUBAL LIGATION  1989       @FMX@    No current outpatient prescriptions on file.       Allergies   Allergen Reactions     Chantix [Varenicline] Rash     Lisinopril Cough     Strattera [Atomoxetine] Rash     Zoloft [Sertraline] Rash       Pt reports that she has been smoking Cigarettes.  She does not have any smokeless tobacco history on file. She reports that she drinks alcohol. She reports that she does not use illicit drugs.    Exam:  BP (!) 140/100  Temp 98.2  F (36.8  C) (Oral)  Resp 16  Ht 1.727 m (5' 8\")  Wt 98.9 kg (218 lb)  SpO2 98%  BMI 33.15 kg/m2    Awake, Alert OX3  Lungs - CTA bilaterally  CV - RRR, no murmurs, distal pulses intact  Abd - soft, non-distended, non-tender, +BS  Extr - No cyanosis or edema    A/P 51 year old year old female in need of colonoscopy for screening. Risks, benefits, alternatives, and complications were discussed including the possibility of perforation and the patient agreed to proceed    Chito Ordoñez MD     "

## 2017-06-22 NOTE — ANESTHESIA POSTPROCEDURE EVALUATION
Patient: Yaneli Webster    Procedure(s):  Colonoscopy - Wound Class: II-Clean Contaminated    Diagnosis:screening  Diagnosis Additional Information: No value filed.    Anesthesia Type:  MAC, General    Note:  Anesthesia Post Evaluation    Patient location during evaluation: Bedside  Patient participation: Able to fully participate in evaluation  Level of consciousness: awake and alert  Pain management: adequate  Airway patency: patent  Cardiovascular status: acceptable  Respiratory status: acceptable  Hydration status: acceptable  PONV: none     Anesthetic complications: None          Last vitals:  Vitals:    06/22/17 0824   BP: (!) 140/100   Resp: 16   Temp: 36.8  C (98.2  F)   SpO2: 98%         Electronically Signed By: DEMARCO Bosch CRNA  June 22, 2017  9:55 AM

## 2017-06-23 LAB — COPATH REPORT: NORMAL

## 2017-06-23 NOTE — OR NURSING
Patient called and stated having pain in LLQ today after having colonoscopy with Dr Ordoñez.  Denies bleeding, fever, chills.  States is passing flatus and had bowel movement today.  Instructed to go to ED or urgent care if worsens or has above symptoms.  Patient agrees with plan.

## 2017-06-27 PROBLEM — K63.5 COLON POLYPS: Status: ACTIVE | Noted: 2017-06-27

## 2017-07-21 ENCOUNTER — TELEPHONE (OUTPATIENT)
Dept: FAMILY MEDICINE | Facility: CLINIC | Age: 52
End: 2017-07-21

## 2017-07-21 DIAGNOSIS — M25.561 CHRONIC PAIN OF RIGHT KNEE: ICD-10-CM

## 2017-07-21 DIAGNOSIS — G89.29 CHRONIC PAIN OF RIGHT KNEE: ICD-10-CM

## 2017-07-21 NOTE — TELEPHONE ENCOUNTER
Tramadol    Last Written Prescription Date: 6/9/17  Last Fill Quantity: 90,  # refills: 0   Last Office Visit with G, P or [facility] prescribing provider: 6/9/17    Patient is calling stating she only has 4 pills left.  Edith Calderon  [facility] [place] [facility]

## 2017-07-24 RX ORDER — TRAMADOL HYDROCHLORIDE 50 MG/1
50-100 TABLET ORAL EVERY 6 HOURS PRN
Qty: 10 TABLET | Refills: 0 | Status: SHIPPED | OUTPATIENT
Start: 2017-07-24 | End: 2017-08-08

## 2017-07-24 NOTE — TELEPHONE ENCOUNTER
I ordered 10 for her until Valarie is back which is tomorrow, or she can wait until Valarie can address for her usual amount  Signed and in my outbasket

## 2017-07-24 NOTE — TELEPHONE ENCOUNTER
Called patient and told her I was walking the Rx to our [facility] here, at [place].  Edith Calderon  [facility] [place] [facility]

## 2017-07-26 ENCOUNTER — HOSPITAL ENCOUNTER (OUTPATIENT)
Dept: PHYSICAL THERAPY | Facility: CLINIC | Age: 52
Setting detail: THERAPIES SERIES
End: 2017-07-26
Attending: ORTHOPAEDIC SURGERY
Payer: COMMERCIAL

## 2017-07-26 PROCEDURE — 97110 THERAPEUTIC EXERCISES: CPT | Mod: GP | Performed by: PHYSICAL THERAPIST

## 2017-07-26 PROCEDURE — 97161 PT EVAL LOW COMPLEX 20 MIN: CPT | Mod: GP | Performed by: PHYSICAL THERAPIST

## 2017-07-26 PROCEDURE — 40000718 ZZHC STATISTIC PT DEPARTMENT ORTHO VISIT: Performed by: PHYSICAL THERAPIST

## 2017-07-26 NOTE — PROGRESS NOTES
07/26/17 1600   General Information   Type of Visit Initial OP Ortho PT Evaluation   Start of Care Date 07/26/17   Referring Physician Comfort   Patient/Family Goals Statement pt wants less pain in her knee   Orders Evaluate and Treat   Date of Order 07/19/17   Insurance Type [employer]   Insurance Comments/Visits Authorized send auth 2 weeks prior tot  21st visit   Medical Diagnosis R Knee pain   Surgical/Medical history reviewed Yes   Precautions/Limitations no known precautions/limitations   Body Part(s)   Body Part(s) Knee   Presentation and Etiology   Pertinent history of current problem (include personal factors and/or comorbidities that impact the POC) Pt states she had a scope done this past spring in the RLE with no relief. pt states her right knee is a 10/10. pt states she had her L knee replaced with relief. pt is a home health aid, on her feet. pt states she always has to wear the brace except for in bed. pt states she owns a cane/ crutches. pt states she has a grandson, just got a house in [place]. pt's goal is to be able to move again, walk. pt states she has gained weight due to not moving because the knee hurts so bad. Pt is hoping that she can get a TKA on the right as well.    Impairments A. Pain;B. Decreased WB tolerance;C. Swelling;D. Decreased ROM;E. Decreased flexibility;F. Decreased strength and endurance;H. Impaired gait;J. Burning;M. Locking or catching   Functional Limitations perform activities of daily living;perform required work activities;perform desired leisure / sports activities   Symptom Location R Knee   How/Where did it occur From insidious onset   Onset date of current episode/exacerbation 07/26/15   Chronicity Chronic   Pain rating (0-10 point scale) Best (/10);Worst (/10)   Best (/10) 0   Worst (/10) 10   Pain quality A. Sharp;C. Aching;D. Burning;E. Shooting;F. Stabbing;G. Cramping   Frequency of pain/symptoms A. Constant   Pain/symptoms exacerbated by A. Sitting;B.  Walking;C. Lifting;D. Carrying;E. Rest;F. Nothing;H. Overhead reach;G. Certain positions;I. Bending;J. ADL;K. Home tasks;L. Work tasks   Pain/symptoms eased by H. Cold;J. Braces/supports   Current / Previous Interventions   Diagnostic Tests: MRI   MRI Results Results   MRI results marked OA, ACL cyst, burst baker's cyst. see full report for full details.   Current Level of Function   Patient role/employment history A. Employed   Employment Comments pt works as a home health aid.    Fall Risk Screen   Fall screen completed by PT   Per patient - Fall 2 or more times in past year? No   Per patient - Fall with injury in past year? No   Is patient a fall risk? No   Knee Objective Findings   Side (if bilateral, select both right and left) Right   Integumentary  observed bruising in the posterior knee.   Gait/Locomotion Pt demonstrates limping on the RLE, lateral trunk leaning   Knee ROM Comment L Knee ROM 0-120* motion.   Lachmans Test - RLE, increased pain   Anterior Drawer Test - RLE   Posterior Drawer Test - RLE   Moody's Test NT due to pt not tolerating increased flexion in the knee   Apprehension Test + in the RLE   Knee Special Test Comments specials tests of varus/ valgus not tolerated due to increased pain and testing position   Palpation Increase dpain along the medial/ lateral border of the knee joint with swelling palpable   Accessory Motion/Joint Mobility tightness in the tib/ femoral joint, decreased mobility in the patellofemoral joint.   Right Knee Extension AROM 5*   Right Knee Extension PROM 5*   Right Knee Flexion AROM 70*   Right Knee Flexion PROM 90*   Right Knee Flexion Strength 4-/5   Right Knee Extension Strength 4-/5   R VMO Strength not tolerated, pt demonstrating extensor lag with SLR   Right Gastrocnemius Flexibility + tightness in the RLE   Right Hamstring Flexibility + tightness in the RLE   Planned Therapy Interventions   Planned Therapy Interventions IADL retraining;balance training;joint  mobilization;manual therapy;motor coordination training;neuromuscular re-education;ROM;strengthening;stretching   Clinical Impression   Criteria for Skilled Therapeutic Interventions Met yes, treatment indicated   PT Diagnosis Decreased ROM and strength secondary to R knee OA   Influenced by the following impairments decreased ROM and Strength   Functional limitations due to impairments decreased participation with ambulation, work and stair climbing   Clinical Presentation Stable/Uncomplicated   Clinical Presentation Rationale pt presents with stable condition and stable comorbidities, few tests and measures, motivated to participate   Clinical Decision Making (Complexity) Low complexity   Therapy Frequency other (see comments)  (1-2x per week)   Predicted Duration of Therapy Intervention (days/wks) 6 weeks   Risk & Benefits of therapy have been explained Yes   Patient, Family & other staff in agreement with plan of care Yes   Education Assessment   Preferred Learning Style Pictures/video   Barriers to Learning No barriers   ORTHO GOALS   PT Ortho Eval Goals 1;2;3   Ortho Goal 1   Goal Identifier ROM   Goal Description Pt will demonstrate atleast 0-120* ROM in order to perform squatting and carrying activities at work.   Target Date 09/06/17   Ortho Goal 2   Goal Identifier Strength   Goal Description Pt will demonstrate improved knee strength of 4+/5 knee extension in order to improve gait pattern.   Target Date 09/06/17   Ortho Goal 3   Goal Identifier HEP   Goal Description Pt will demonstrate proper form and duration for HEP in order to progress and self manage symptoms.   Target Date 08/23/17   Total Evaluation Time   Total Evaluation Time 20

## 2017-08-04 ENCOUNTER — TELEPHONE (OUTPATIENT)
Dept: FAMILY MEDICINE | Facility: CLINIC | Age: 52
End: 2017-08-04

## 2017-08-04 DIAGNOSIS — M25.562 LEFT KNEE PAIN, UNSPECIFIED CHRONICITY: Primary | ICD-10-CM

## 2017-08-04 RX ORDER — TRAMADOL HYDROCHLORIDE 50 MG/1
50-100 TABLET ORAL EVERY 6 HOURS PRN
Qty: 20 TABLET | Refills: 0 | Status: SHIPPED | OUTPATIENT
Start: 2017-08-04 | End: 2017-08-24

## 2017-08-04 NOTE — TELEPHONE ENCOUNTER
Reason for Call:  Medication or medication refill:    Do you use a [facility]?  Name of the pharmacy and phone number for the current request:  [facility] -     Name of the medication requested: Tramadol    Other - Pt said she is to do a Trial of Physical Therapy for her Rt Knee Issue per Ins before the Surgeon can do anything with it. Pt said  She has started the Physical Therapy and is still having pain. Could she get her Tramadol refilled.    Can we leave a detailed message on this number? YES    Phone number patient can be reached at: Home number on file [phone] (home)    Best Time: Any Time      Call taken on 8/4/2017 at 10:40 AM by Tracie Hoffmann

## 2017-08-16 ENCOUNTER — HOSPITAL ENCOUNTER (OUTPATIENT)
Dept: PHYSICAL THERAPY | Facility: CLINIC | Age: 52
Setting detail: THERAPIES SERIES
End: 2017-08-16
Attending: ORTHOPAEDIC SURGERY
Payer: COMMERCIAL

## 2017-08-16 PROCEDURE — 97110 THERAPEUTIC EXERCISES: CPT | Mod: GP | Performed by: PHYSICAL THERAPIST

## 2017-08-16 PROCEDURE — 40000718 ZZHC STATISTIC PT DEPARTMENT ORTHO VISIT: Performed by: PHYSICAL THERAPIST

## 2017-08-16 NOTE — PROGRESS NOTES
Outpatient Physical Therapy Progress Note     Patient: Yaneli Webster  : 1965    Beginning/End Dates of Reporting Period:  2017 to 2017    Referring Provider: Comfort    Therapy Diagnosis: Decreased ROM and Strength secondary to Chronic R Knee pain     Client Self Report: Pt states her knees hurt really bad after she performed exercises. pt states she had pain in the left leg, almost fell at work. pt states her R knee hurts as usual but her L knee also has started to hurt more.    Objective Measurements:  Objective Measure: ROM  Details: RLE lacking 7* of full extension with increased pain. 83* flexion with increased pain. LLE 0-113* flexion.           Goals:  Goal Identifier ROM   Goal Description Pt will demonstrate atleast 0-120* ROM in order to perform squatting and carrying activities at work.   Target Date 17   Date Met      Progress:     Goal Identifier Strength   Goal Description Pt will demonstrate improved knee strength of 4+/5 knee extension in order to improve gait pattern.   Target Date 17   Date Met      Progress:     Goal Identifier HEP   Goal Description Pt will demonstrate proper form and duration for HEP in order to progress and self manage symptoms.   Target Date 17   Date Met      Progress:       Progress Toward Goals:   Progress limited due to pt not performing HEP consistently as pt only performed 3 times. However, pt continues to demonstrate sharp pain with ROM, stretching and strengthening exercises. Pt agrees to participate more with PT including pain free exercises that achieve ROM and Strength goals.      Plan:  Continue therapy per current plan of care of visits 1-2 times per week.    Discharge:  No

## 2017-08-21 ENCOUNTER — HOSPITAL ENCOUNTER (OUTPATIENT)
Dept: PHYSICAL THERAPY | Facility: CLINIC | Age: 52
Setting detail: THERAPIES SERIES
End: 2017-08-21
Attending: ORTHOPAEDIC SURGERY
Payer: COMMERCIAL

## 2017-08-21 PROCEDURE — 97110 THERAPEUTIC EXERCISES: CPT | Mod: GP | Performed by: PHYSICAL THERAPIST

## 2017-08-21 PROCEDURE — 40000718 ZZHC STATISTIC PT DEPARTMENT ORTHO VISIT: Performed by: PHYSICAL THERAPIST

## 2017-08-24 ENCOUNTER — OFFICE VISIT (OUTPATIENT)
Dept: FAMILY MEDICINE | Facility: CLINIC | Age: 52
End: 2017-08-24
Payer: COMMERCIAL

## 2017-08-24 VITALS
RESPIRATION RATE: 16 BRPM | TEMPERATURE: 97.4 F | BODY MASS INDEX: 32.23 KG/M2 | DIASTOLIC BLOOD PRESSURE: 88 MMHG | SYSTOLIC BLOOD PRESSURE: 130 MMHG | WEIGHT: 212 LBS

## 2017-08-24 DIAGNOSIS — I10 ESSENTIAL HYPERTENSION: Primary | ICD-10-CM

## 2017-08-24 DIAGNOSIS — F43.23 ADJUSTMENT DISORDER WITH MIXED ANXIETY AND DEPRESSED MOOD: ICD-10-CM

## 2017-08-24 DIAGNOSIS — Z13.29 SCREENING FOR HYPOTHYROIDISM: ICD-10-CM

## 2017-08-24 DIAGNOSIS — M25.562 LEFT KNEE PAIN, UNSPECIFIED CHRONICITY: ICD-10-CM

## 2017-08-24 DIAGNOSIS — E78.5 HYPERLIPIDEMIA LDL GOAL <100: ICD-10-CM

## 2017-08-24 PROCEDURE — 99214 OFFICE O/P EST MOD 30 MIN: CPT | Performed by: NURSE PRACTITIONER

## 2017-08-24 RX ORDER — HYDROCHLOROTHIAZIDE 50 MG/1
50 TABLET ORAL DAILY
Qty: 30 TABLET | Refills: 3 | Status: SHIPPED | OUTPATIENT
Start: 2017-08-24

## 2017-08-24 RX ORDER — VENLAFAXINE HYDROCHLORIDE 37.5 MG/1
37.5 CAPSULE, EXTENDED RELEASE ORAL DAILY
Qty: 90 CAPSULE | Refills: 3 | Status: SHIPPED | OUTPATIENT
Start: 2017-08-24 | End: 2020-03-03

## 2017-08-24 RX ORDER — TRAMADOL HYDROCHLORIDE 50 MG/1
50-100 TABLET ORAL EVERY 6 HOURS PRN
Qty: 20 TABLET | Refills: 0 | Status: SHIPPED | OUTPATIENT
Start: 2017-08-24 | End: 2017-09-07

## 2017-08-24 ASSESSMENT — ANXIETY QUESTIONNAIRES
2. NOT BEING ABLE TO STOP OR CONTROL WORRYING: NEARLY EVERY DAY
3. WORRYING TOO MUCH ABOUT DIFFERENT THINGS: NEARLY EVERY DAY
7. FEELING AFRAID AS IF SOMETHING AWFUL MIGHT HAPPEN: MORE THAN HALF THE DAYS
1. FEELING NERVOUS, ANXIOUS, OR ON EDGE: NEARLY EVERY DAY
6. BECOMING EASILY ANNOYED OR IRRITABLE: NEARLY EVERY DAY
GAD7 TOTAL SCORE: 19
5. BEING SO RESTLESS THAT IT IS HARD TO SIT STILL: NEARLY EVERY DAY

## 2017-08-24 ASSESSMENT — PATIENT HEALTH QUESTIONNAIRE - PHQ9
SUM OF ALL RESPONSES TO PHQ QUESTIONS 1-9: 12
5. POOR APPETITE OR OVEREATING: MORE THAN HALF THE DAYS

## 2017-08-24 NOTE — PATIENT INSTRUCTIONS
Increase the Hydrochlorothiazide to 50 mg daily.    Start Effexor 37.5 mg daily    Check blood pressure daily over the next 2 weeks and log them. Call readings into my care team. If you have any readings greater than 150/90 call us.    Schedule an appointment in a month for a physical exam bring your log with you.

## 2017-08-24 NOTE — LETTER
[facility]  [id number]  [facility] [address]  [phone]          August 31, 2018    Yaneli Webster  [address] [facility] [address]              Dear Yaneli Webster    This is to remind you that your provider wanted you to return to the clinic for fasting lab test(s),    please fast for 10-12 hours. Morning medications can be taken with water.    You may call our office at [phone] to schedule an appointment.    Please disregard this notice if you have already had your labs drawn or made an appointment.          Sincerely,        Marisel Dang CNP

## 2017-08-24 NOTE — NURSING NOTE
"Chief Complaint   Patient presents with     Hypertension     follow up     Knee Pain     refill       Initial /88  Resp 16  Wt 212 lb (96.2 kg)  Breastfeeding? No  BMI 32.23 kg/m2 Estimated body mass index is 32.23 kg/(m^2) as calculated from the following:    Height as of 6/22/17: 5' 8\" (1.727 m).    Weight as of this encounter: 212 lb (96.2 kg).  Medication Reconciliation: complete    Health Maintenance that is potentially due pending provider review:  Pap Smear and GAD7    Pt will schedule pap appt.    Is there anyone who you would like to be able to receive your results? No  If yes have patient fill out HAYDER    "

## 2017-08-24 NOTE — PROGRESS NOTES
SUBJECTIVE:   Yaneli Webster is a 51 year old female who presents to clinic today for the following health issues:       Hypertension Follow-up      Outpatient blood pressures are being checked at home.  Results are running high.    Low Salt Diet: no added salt    Amount of exercise or physical activity: physical therapy    Problems taking medications regularly: No    Medication side effects: none  Diet: regular (no restrictions)    Blood pressure was 201/111 at work last week. Had headache, felt weird and lightheaded. Did not seek medical care.    BP Readings from Last 6 Encounters:   08/24/17 130/88   06/22/17 (!) 158/101   06/09/17 138/88   05/23/17 124/88   05/18/17 (!) 165/104         Knee Pain      Duration: Ongoing    Description (location/character/radiation): follow up, med refill    Intensity:  moderate, severe    Accompanying signs and symptoms: none    History (similar episodes/previous evaluation): imaging, previous evaluations    Precipitating or alleviating factors: None    Therapies tried and outcome: Tramadol, Physical Therapy     Still favors right knee  Has a significant history of right knee pain. Has had a total of 3 arthroscopies on right knee, last in 2/2017.   Left knee partial done in 2012- hardware on medial side of knee. Plans to have MRI after TKA  Dr. Angel- plans to do surgery, saw 2 months ago. Needs to have TKA. Needs to complete 3 or 4 more treatments.    Tramadol 1-2 tabs daily, some days, Ibuprofen helps  Wants to be done off pain meds, doesn't like and wants to get back to Tylenol or Ibuprofen   Pain worse at night, uses pillows between legs.       Right knee MRI on 6/8/17:  1. Changes of partial medial meniscectomy. Small tear of the body segment.  2. Changes of mild partial lateral meniscectomy. Persistent horizontal tearing  3. Prominent ACL cyst formation  4. MCL sprain  5. Lateral tibial plateau and trochlear chondromalacia  6. Synovitis and moderate effusion  7. Baker's  cyst with leakage.    Plans to have teeth taken care of following her ortho care. Needs some extensive dental care    Anxiety and depression  Had been on antidepressant in the past but was changed to anxiety med. Unsure what one. Now having difficulty with mood, sadness. Is agreeable to trying an antidepressant.    Health maintenance  Due for screens for hyperlipidemia, hypothyroidism,      Problem list and histories reviewed & adjusted, as indicated.  Additional history: as documented      Reviewed and updated as needed this visit by clinical staffTobacco  Allergies  Meds  Problems  Med Hx  Surg Hx  Fam Hx  Soc Hx        Reviewed and updated as needed this visit by Provider  Allergies  Meds  Problems         ROS:  Constitutional, HEENT, cardiovascular, pulmonary, GI, , musculoskeletal, neuro, skin, endocrine and psych systems are negative, except as otherwise noted.      OBJECTIVE:   /88  Temp 97.4  F (36.3  C) (Tympanic)  Resp 16  Wt 212 lb (96.2 kg)  Breastfeeding? No  BMI 32.23 kg/m2  Body mass index is 32.23 kg/(m^2).  GENERAL: healthy, alert and no distress  NECK: no adenopathy, no asymmetry, masses, or scars and thyroid normal to palpation  RESP: lungs clear to auscultation - no rales, rhonchi or wheezes  CV: regular rate and rhythm, normal S1 S2, no S3 or S4, no murmur, click or rub, no peripheral edema and peripheral pulses strong  MS: LLE exam shows right knee tender to palpation on medial prepatellar region, ROM is limited due to pain  NEURO: Normal strength and tone, mentation intact and speech normal  PSYCH: mentation appears normal, affect normal/bright    Diagnostic Test Results:      ASSESSMENT/PLAN:     ASSESSMENT:  1. Essential hypertension . Not well controlled   2. Left knee pain, unspecified chronicity    3. Hyperlipidemia LDL goal <100    4. Screening for hypothyroidism    5. Adjustment disorder with mixed anxiety and depressed mood . Recurrence of depressed mood.   Discussed use of effexor and she agrees to a trial.        PLAN:  Orders Placed This Encounter     **Lipid panel reflex to direct LDL FUTURE 2mo     **Comprehensive metabolic panel FUTURE anytime     **TSH with free T4 reflex FUTURE anytime     hydrochlorothiazide (HYDRODIURIL) 50 MG tablet      traMADol (ULTRAM) 50 MG tablet     venlafaxine (EFFEXOR-XR) 37.5 MG 24 hr capsule       Patient Instructions   Increase the Hydrochlorothiazide to 50 mg daily.    Start Effexor 37.5 mg daily    Check blood pressure daily over the next 2 weeks and log them. Call readings into my care team. If you have any readings greater than 150/90 call us.    Schedule an appointment in a month for a physical exam bring your log with you.    TT spent: 25 minutes of which 25 minutes were spent in direct face to face contact with patient/family. Patient teaching done regarding: blood pressure, depression. Greater than 50% of time spent counseling and/or coordinating care.     Marisel Dang, CARLOS, APRN CNP  [facility]

## 2017-08-24 NOTE — MR AVS SNAPSHOT
After Visit Summary   8/24/2017    Yaneli Webster    MRN: [id number]           Patient Information     Date Of Birth          1965        Visit Information        Provider Department      8/24/2017 11:20 AM Marisel Dang APRN CNP [facility] [place]        Today's Diagnoses     Essential hypertension    -  1    Left knee pain, unspecified chronicity        Hyperlipidemia LDL goal <100        Screening for hypothyroidism        Adjustment disorder with mixed anxiety and depressed mood          Care Instructions    Increase the Hydrochlorothiazide to 50 mg daily.    Start Effexor 37.5 mg daily    Check blood pressure daily over the next 2 weeks and log them. Call readings into my care team. If you have any readings greater than 150/90 call us.    Schedule an appointment in a month for a physical exam bring your log with you.          Follow-ups after your visit        Future tests that were ordered for you today     Open Future Orders        Priority Expected Expires Ordered    **Comprehensive metabolic panel FUTURE anytime Routine 8/24/2017 8/24/2018 8/24/2017    **TSH with free T4 reflex FUTURE anytime Routine 8/24/2017 8/24/2018 8/24/2017    **Lipid panel reflex to direct LDL FUTURE 2mo Routine 10/23/2017 12/22/2017 8/24/2017            Who to contact     If you have questions or need follow up information about today's clinic visit or your schedule please contact [facility] directly at [phone].  Normal or non-critical lab and imaging results will be communicated to you by MyChart, letter or phone within 4 business days after the clinic has received the results. If you do not hear from us within 7 days, please contact the clinic through MyChart or phone. If you have a critical or abnormal lab result, we will notify you by phone as soon as possible.  Submit refill requests through [employer] or call your pharmacy and they will forward the refill request to us.  "Please allow 3 business days for your refill to be completed.          Additional Information About Your Visit        [employer] Information     [employer] lets you send messages to your doctor, view your test results, renew your prescriptions, schedule appointments and more. To sign up, go to [email or web address] . Click on \"Log in\" on the left side of the screen, which will take you to the Welcome page. Then click on \"Sign up Now\" on the right side of the page.     You will be asked to enter the access code listed below, as well as some personal information. Please follow the directions to create your username and password.     Your access code is: [email or web address]  Expires: 2017 12:19 PM     Your access code will  in 90 days. If you need help or a new code, please call your [place] clinic or [phone].        Care EveryWhere ID     This is your Care EveryWhere ID. This could be used by other organizations to access your [place] medical records  [id number]        Your Vitals Were     Temperature Respirations Breastfeeding? BMI (Body Mass Index)          97.4  F (36.3  C) (Tympanic) 16 No 32.23 kg/m2         Blood Pressure from Last 3 Encounters:   17 130/88   17 (!) 158/101   17 138/88    Weight from Last 3 Encounters:   17 212 lb (96.2 kg)   17 218 lb (98.9 kg)   17 218 lb (98.9 kg)                 Today's Medication Changes          These changes are accurate as of: 17 12:27 PM.  If you have any questions, ask your nurse or doctor.               Start taking these medicines.        Dose/Directions    venlafaxine 37.5 MG 24 hr capsule   Commonly known as:  EFFEXOR-XR   Used for:  Adjustment disorder with mixed anxiety and depressed mood   Started by:  Marisel Dang APRN CNP        Dose:  37.5 mg   Take 1 capsule (37.5 mg) by mouth daily   Quantity:  90 capsule   Refills:  3         These medicines have changed or have updated prescriptions.        " Dose/Directions    * hydrochlorothiazide 25 MG tablet   Commonly known as:  HYDRODIURIL   This may have changed:  Another medication with the same name was added. Make sure you understand how and when to take each.   Used for:  Benign essential hypertension   Changed by:  Marisel Dang APRN CNP        Dose:  25 mg   Take 1 tablet (25 mg) by mouth daily   Quantity:  90 tablet   Refills:  3       * hydrochlorothiazide 50 MG tablet   Commonly known as:  HYDRODIURIL   This may have changed:  You were already taking a medication with the same name, and this prescription was added. Make sure you understand how and when to take each.   Used for:  Essential hypertension   Changed by:  Marisel Dang APRN CNP        Dose:  50 mg   Take 1 tablet (50 mg) by mouth daily   Quantity:  30 tablet   Refills:  3       traMADol 50 MG tablet   Commonly known as:  ULTRAM   This may have changed:  additional instructions   Used for:  Left knee pain, unspecified chronicity   Changed by:  Marisel Dang APRN CNP        Dose:   mg   Take 1-2 tablets ( mg) by mouth every 6 hours as needed for pain maximum 6 tablet(s) per day   Quantity:  20 tablet   Refills:  0       * Notice:  This list has 2 medication(s) that are the same as other medications prescribed for you. Read the directions carefully, and ask your doctor or other care provider to review them with you.         Where to get your medicines      These medications were sent to [facility] [address] [address]     Phone:  [phone]     hydrochlorothiazide 50 MG tablet    venlafaxine 37.5 MG 24 hr capsule         Some of these will need a paper prescription and others can be bought over the counter.  Ask your nurse if you have questions.     Bring a paper prescription for each of these medications     traMADol 50 MG tablet                Primary Care Provider Office Phone # Fax #    Marisel  Savannah Dang, APRN [phone]       [address] [facility] [address]        Equal Access to Services     HILDA DURANT : Hadii aad ku hadnoemarsha Adan, wajackieda magishailaha, qaandrewta kagénesisda gemini, manan emiliain hayaakenna avaloslion reveles laRadhaivory null. So [facility] [phone].    ATENCIÓN: Si habla español, tiene a luna disposición servicios gratuitos de asistencia lingüística. Llame al [phone].    We comply with applicable federal civil rights laws and [place] laws. We do not discriminate on the basis of race, color, national origin, age, disability sex, sexual orientation or gender identity.            Thank you!     Thank you for choosing [facility]  for your care. Our goal is always to provide you with excellent care. Hearing back from our patients is one way we can continue to improve our services. Please take a few minutes to complete the written survey that you may receive in the mail after your visit with us. Thank you!             Your Updated Medication List - Protect others around you: Learn how to safely use, store and throw away your medicines at www.disposemymeds.org.          This list is accurate as of: 8/24/17 12:27 PM.  Always use your most recent med list.                   Brand Name Dispense Instructions for use Diagnosis    atorvastatin 20 MG tablet    LIPITOR    90 tablet    Take 1 tablet (20 mg) by mouth daily    Hyperlipidemia LDL goal <100       * hydrochlorothiazide 25 MG tablet    HYDRODIURIL    90 tablet    Take 1 tablet (25 mg) by mouth daily    Benign essential hypertension       * hydrochlorothiazide 50 MG tablet    HYDRODIURIL    30 tablet    Take 1 tablet (50 mg) by mouth daily    Essential hypertension       losartan 100 MG tablet    COZAAR    90 tablet    Take 1 tablet (100 mg) by mouth daily    Benign essential hypertension       metoprolol 25 MG 24 hr tablet    TOPROL-XL    90 tablet    Take 1 tablet (25 mg) by mouth daily    Benign essential hypertension        OMEPRAZOLE PO           traMADol 50 MG tablet    ULTRAM    20 tablet    Take 1-2 tablets ( mg) by mouth every 6 hours as needed for pain maximum 6 tablet(s) per day    Left knee pain, unspecified chronicity       venlafaxine 37.5 MG 24 hr capsule    EFFEXOR-XR    90 capsule    Take 1 capsule (37.5 mg) by mouth daily    Adjustment disorder with mixed anxiety and depressed mood       * Notice:  This list has 2 medication(s) that are the same as other medications prescribed for you. Read the directions carefully, and ask your doctor or other care provider to review them with you.

## 2017-08-25 ASSESSMENT — ANXIETY QUESTIONNAIRES: GAD7 TOTAL SCORE: 19

## 2017-09-07 DIAGNOSIS — M25.562 LEFT KNEE PAIN, UNSPECIFIED CHRONICITY: ICD-10-CM

## 2017-09-07 NOTE — TELEPHONE ENCOUNTER
Tramadol 50mg      Last Written Prescription Date:  8/24/17  Last Fill Quantity: 20,   # refills: 0  Last Office Visit with [facility], [facility] or [facility] prescribing provider: 8/24/17  Future Office visit:    Next 5 appointments (look out 90 days)     Sep 20, 2017 10:20 AM CDT   PHYSICAL with DEMARCO Maddox CNP   [facility] ([facility])    [address] [facility] [address]   [phone]                   Routing refill request to provider for review/approval because:  Drug not on the [facility], [facility] or [facility] refill protocol or controlled substance  Thank you very kindly!  Hilda Bailey CPhT  [facility] on behalf of [facility]

## 2017-09-08 RX ORDER — TRAMADOL HYDROCHLORIDE 50 MG/1
50-100 TABLET ORAL EVERY 6 HOURS PRN
Qty: 20 TABLET | Refills: 0 | Status: SHIPPED | OUTPATIENT
Start: 2017-09-08 | End: 2020-03-03

## 2017-09-14 ENCOUNTER — HOSPITAL ENCOUNTER (OUTPATIENT)
Dept: PHYSICAL THERAPY | Facility: CLINIC | Age: 52
Setting detail: THERAPIES SERIES
End: 2017-09-14
Attending: ORTHOPAEDIC SURGERY
Payer: COMMERCIAL

## 2017-09-14 PROCEDURE — 97110 THERAPEUTIC EXERCISES: CPT | Mod: GP | Performed by: PHYSICAL THERAPIST

## 2017-09-14 PROCEDURE — 40000718 ZZHC STATISTIC PT DEPARTMENT ORTHO VISIT: Performed by: PHYSICAL THERAPIST

## 2017-09-14 NOTE — PROGRESS NOTES
Outpatient Physical Therapy Progress Note     Patient: Yaneli Webster  : 1965    Beginning/End Dates of Reporting Period:  17 to 2017    Referring Provider: Dr. Angel    Therapy Diagnosis: R knee pain     Client Self Report:  Reports that her pain has not changed with the exercises.  Reports pain has been limiting her ability to work affectively, however feels that she has noticed an improvement in strength.    Objective Measurements:  Objective Measure: ROM  Details: RLE lacking 1-2 of full extension with increased pain. 115* flexion with increased pain. LLE 0-113* flexion.  Objective Measure: Knee extension strength  Details: 4-/5 B, pain with testing R    Goals:  Goal Identifier ROM   Goal Description Pt will demonstrate atleast 0-120* ROM in order to perform squatting and carrying activities at work.   Target Date 17   Date Met   (1* - 115*)   Progress:     Goal Identifier Strength   Goal Description Pt will demonstrate improved knee strength of 4+/5 knee extension in order to improve gait pattern.   Target Date 17   Date Met   (4-/5)   Progress:     Goal Identifier HEP   Goal Description Pt will demonstrate proper form and duration for HEP in order to progress and self manage symptoms.   Target Date 17   Date Met   (Patient inconsistent with attendance)   Progress:     Progress Toward Goals:   Progress this reporting period: Patient has been slight inconsistent with therapy appointment due to unpredictable work schedules, however has been faithful with her HEP.  Patient has been able to come to 4 therapy sessions and shows improvements with ROM and strength, however is continuing to have same level of pain.  The patient continues to demonstrate some deficits with ROM and strength that is limiting her functional mobility.  Encouraged patient to follow up with ther referring provider due to no change in her level of pain.  Recommend that the patient continue with PT to  progress strength and ROM, since improvements have been noted with exercises.    Plan:  Continue therapy per current plan of care.    Discharge:  No

## 2017-12-06 ENCOUNTER — TELEPHONE (OUTPATIENT)
Dept: FAMILY MEDICINE | Facility: CLINIC | Age: 52
End: 2017-12-06

## 2017-12-06 NOTE — TELEPHONE ENCOUNTER
Panel Management Review      Patient has the following on her problem list:     Hypertension   Last three blood pressure readings:  BP Readings from Last 3 Encounters:   08/24/17 130/88   06/22/17 (!) 158/101   06/09/17 138/88     Blood pressure: Passed    HTN Guidelines:  Age 18-59 BP range:  Less than 140/90  Age 60-85 with Diabetes:  Less than 140/90  Age 60-85 without Diabetes:  less than 150/90        Composite cancer screening  Chart review shows that this patient is due/due soon for the following Pap Smear  Summary:    Patient is due/failing the following:   PAP    Action needed:   Patient needs office visit for physical.    Type of outreach:    Sent letter.    Questions for provider review:    None                                                                                                                                    Edith ISSA [facility]       Chart routed to Care Team .

## 2017-12-06 NOTE — LETTER
[facility]  [address] [place] [address]  [phone]        Yaneli Webster                                                               Date: 12/6/2017  [facility] [address] [place] [id number]      Dear Yaneli,    In order to ensure we are providing the best quality care, we have reviewed your chart and see that you are due for:  1.   Pap  Please call the clinic at your earliest convenience to schedule a physical exam appointment.    We greatly appreciate the opportunity to serve you.  Thank you for trusting us with your health care.      Sincerely,    Your care team at [facility]    Valarie HEATON,NP/Edith ISSA CMA  
17.4   10.0  )-----------( 283      ( 16 Mar 2017 19:50 )             50.4     16 Mar 2017 19:50    139    |  101    |  18     ----------------------------<  96     3.3     |  27     |  1.10     Ca    8.9        16 Mar 2017 19:50    TPro  8.2    /  Alb  4.5    /  TBili  1.2    /  DBili  x      /  AST  32     /  ALT  36     /  AlkPhos  71     16 Mar 2017 19:50

## 2018-01-31 NOTE — PROGRESS NOTES
Outpatient Physical Therapy Discharge Note     Patient: Yaneli Webster  : 1965    Beginning/End Dates of Reporting Period:  17 to 17    Referring Provider: Dr. Jeanne Brown Diagnosis: Decreased ROM and Strength Secondary to R knee OA    Patient did not return for follow up treatments as directed.  Goal status and current objective information is therefore unknown.  Discharge from PT services at this time for this episode of treatment. Please see attached documentation under this episode of care for further information including dates of service, start of care date, referring physician, Dx, treatment plan, treatments, etc.    Please contact me with any questions or concerns.    Thank you for your referral.    Ashley Gonzalez, PT, DPT, CLT  Physical Therapist & Certified Lymphedema Therapist  [address] [address] [phone]

## 2018-06-28 ENCOUNTER — TELEPHONE (OUTPATIENT)
Dept: FAMILY MEDICINE | Facility: CLINIC | Age: 53
End: 2018-06-28

## 2018-06-28 NOTE — TELEPHONE ENCOUNTER
Panel Management Review      Patient has the following on her problem list:     Depression / Dysthymia review    Measure:  Needs PHQ-9 score of 4 or less during index window.  Administer PHQ-9 and if score is 5 or more, send encounter to provider for next steps.    5 - 7 month window range:     PHQ-9 SCORE 8/24/2017   Total Score 12       If PHQ-9 recheck is 5 or more, route to provider for next steps.    Patient is due for:  PHQ9    Hypertension   Last three blood pressure readings:  BP Readings from Last 3 Encounters:   08/24/17 130/88   06/22/17 (!) 158/101   06/09/17 138/88     Blood pressure: Passed    HTN Guidelines:  Age 18-59 BP range:  Less than 140/90  Age 60-85 with Diabetes:  Less than 140/90  Age 60-85 without Diabetes:  less than 150/90      Composite cancer screening  Chart review shows that this patient is due/due soon for the following Pap Smear  Summary:    Patient is due/failing the following:   PAP and PHQ9    Action needed:   Patient needs office visit for pap. and Patient needs to do PHQ9.    Type of outreach:    Phone, spoke to patient.  seeking care through [facility]. will set-up pap    Questions for provider review:    None                                                                                                                                    Oanh Burrell MA       Chart routed to Care Team .

## 2018-10-05 ENCOUNTER — TELEPHONE (OUTPATIENT)
Dept: FAMILY MEDICINE | Facility: CLINIC | Age: 53
End: 2018-10-05

## 2018-10-05 NOTE — TELEPHONE ENCOUNTER
Patient was told to return for fasting labs, reminder letter was sent to patient on 08/31/18, patient has still not scheduled an appointment.

## 2019-10-20 ENCOUNTER — OFFICE VISIT (OUTPATIENT)
Dept: URGENT CARE | Facility: URGENT CARE | Age: 54
End: 2019-10-20
Payer: COMMERCIAL

## 2019-10-20 VITALS
DIASTOLIC BLOOD PRESSURE: 81 MMHG | OXYGEN SATURATION: 96 % | HEART RATE: 81 BPM | TEMPERATURE: 97.9 F | SYSTOLIC BLOOD PRESSURE: 116 MMHG

## 2019-10-20 DIAGNOSIS — K08.89 PAIN, DENTAL: Primary | ICD-10-CM

## 2019-10-20 PROCEDURE — 99213 OFFICE O/P EST LOW 20 MIN: CPT | Performed by: HOSPITALIST

## 2019-10-20 NOTE — PROGRESS NOTES
Pt came here for pain on the right lower jaw. Pt has hx of caries on that area. No fever or chill.     Allergies   Allergen Reactions     Chantix [Varenicline] Rash     Lisinopril Cough     Strattera [Atomoxetine] Rash     Zoloft [Sertraline] Rash       History reviewed. No pertinent past medical history.    atorvastatin (LIPITOR) 20 MG tablet, Take 1 tablet (20 mg) by mouth daily  hydrochlorothiazide (HYDRODIURIL) 50 MG tablet, Take 1 tablet (50 mg) by mouth daily  losartan (COZAAR) 100 MG tablet, Take 1 tablet (100 mg) by mouth daily  metoprolol (TOPROL-XL) 25 MG 24 hr tablet, Take 1 tablet (25 mg) by mouth daily  OMEPRAZOLE PO,   hydrochlorothiazide (HYDRODIURIL) 25 MG tablet, Take 1 tablet (25 mg) by mouth daily (Patient not taking: Reported on 10/20/2019)  traMADol (ULTRAM) 50 MG tablet, Take 1-2 tablets ( mg) by mouth every 6 hours as needed for pain maximum 6 tablet(s) per day (Patient not taking: Reported on 10/20/2019)  venlafaxine (EFFEXOR-XR) 37.5 MG 24 hr capsule, Take 1 capsule (37.5 mg) by mouth daily (Patient not taking: Reported on 10/20/2019)    No current facility-administered medications on file prior to visit.       Social History     Tobacco Use     Smoking status: Former Smoker     Packs/day: 0.00     Types: Cigarettes     Smokeless tobacco: Never Used   Substance Use Topics     Alcohol use: Yes     Comment: Occasional        ROS:  12 point ROS is done and aside that mention above all other review of system is negative    OBJECTIVE:  /81   Pulse 81   Temp 97.9  F (36.6  C) (Tympanic)   SpO2 96%   GENERAL APPEARANCE: healthy, alert and moderate distress  On mouth exam I do noted swelling on the gum on the lower jaw. Tender to touch      No results found for this or any previous visit (from the past 168 hour(s)).     ASSESSMENT:     ICD-10-CM    1. Pain, dental K08.89 amoxicillin-clavulanate (AUGMENTIN) 875-125 MG tablet         PLAN:    Will start augmentin for the infection.  Recommend to see dentist as soon as possible.   Lots of rest and fluids.  Tylenol and ibuprofen prn for  Pain    Arturo Garcia MD MD

## 2020-02-16 ENCOUNTER — OFFICE VISIT (OUTPATIENT)
Dept: URGENT CARE | Facility: URGENT CARE | Age: 55
End: 2020-02-16
Payer: COMMERCIAL

## 2020-02-16 VITALS
DIASTOLIC BLOOD PRESSURE: 78 MMHG | SYSTOLIC BLOOD PRESSURE: 122 MMHG | HEART RATE: 65 BPM | OXYGEN SATURATION: 99 % | WEIGHT: 218.8 LBS | TEMPERATURE: 96.6 F | BODY MASS INDEX: 33.27 KG/M2

## 2020-02-16 DIAGNOSIS — K08.89 PAIN, DENTAL: Primary | ICD-10-CM

## 2020-02-16 DIAGNOSIS — I10 ESSENTIAL HYPERTENSION: ICD-10-CM

## 2020-02-16 PROCEDURE — 99213 OFFICE O/P EST LOW 20 MIN: CPT | Performed by: PHYSICIAN ASSISTANT

## 2020-02-16 ASSESSMENT — ENCOUNTER SYMPTOMS
RESPIRATORY NEGATIVE: 1
EYES NEGATIVE: 1
ALLERGIC/IMMUNOLOGIC NEGATIVE: 1
HEADACHES: 0
NAUSEA: 0
BRUISES/BLEEDS EASILY: 0
WOUND: 0
JOINT SWELLING: 0
DIZZINESS: 0
SHORTNESS OF BREATH: 0
RHINORRHEA: 0
VOMITING: 0
CHILLS: 0
WEAKNESS: 0
ARTHRALGIAS: 0
DIARRHEA: 0
PALPITATIONS: 0
BACK PAIN: 0
LIGHT-HEADEDNESS: 0
FEVER: 0
NECK PAIN: 0
COUGH: 0
SORE THROAT: 0
CARDIOVASCULAR NEGATIVE: 1
NECK STIFFNESS: 0
MYALGIAS: 0
ENDOCRINE NEGATIVE: 1
MUSCULOSKELETAL NEGATIVE: 1
HEMATOLOGIC/LYMPHATIC NEGATIVE: 1

## 2020-02-16 ASSESSMENT — PAIN SCALES - GENERAL: PAINLEVEL: EXTREME PAIN (8)

## 2020-02-16 NOTE — NURSING NOTE
"Chief Complaint   Patient presents with     Dental Pain     Started last night.  Right lower dental pain.  Swelling, painful.        Initial /78 (BP Location: Right arm, Patient Position: Chair, Cuff Size: Adult Regular)   Pulse 65   Temp 96.6  F (35.9  C) (Tympanic)   Wt 99.2 kg (218 lb 12.8 oz)   SpO2 99%   BMI 33.27 kg/m   Estimated body mass index is 33.27 kg/m  as calculated from the following:    Height as of 6/22/17: 1.727 m (5' 8\").    Weight as of this encounter: 99.2 kg (218 lb 12.8 oz).    Patient presents to the clinic using No DME    Health Maintenance that is potentially due pending provider review:  NONE    n/a    Is there anyone who you would like to be able to receive your results? No  If yes have patient fill out [facility]  Izaiah Roque M.A.        "

## 2020-02-16 NOTE — PROGRESS NOTES
Chief Complaint:    Chief Complaint   Patient presents with     Dental Pain     Started last night.  Right lower dental pain.  Swelling, painful.        HPI: Yaneli Webtser is an 54 year old female who presents for evaluation and treatment of dental pain.  Symptoms started 1 days ago and have worsened.  She has a Hx of dental problems.  She is working on getting teeth pulled and getting implants.      ROS:      Review of Systems   Constitutional: Negative for chills and fever.   HENT: Positive for dental problem. Negative for congestion, ear pain, rhinorrhea and sore throat.    Eyes: Negative.    Respiratory: Negative.  Negative for cough and shortness of breath.    Cardiovascular: Negative.  Negative for chest pain and palpitations.   Gastrointestinal: Negative for diarrhea, nausea and vomiting.   Endocrine: Negative.    Genitourinary: Negative.    Musculoskeletal: Negative.  Negative for arthralgias, back pain, joint swelling, myalgias, neck pain and neck stiffness.   Skin: Negative.  Negative for rash and wound.   Allergic/Immunologic: Negative.  Negative for immunocompromised state.   Neurological: Negative for dizziness, weakness, light-headedness and headaches.   Hematological: Negative.  Does not bruise/bleed easily.        Family History   Family History   Problem Relation Age of Onset     Crohn's Disease Mother      Diabetes Father      Hypertension Brother      Hypertension Sister      Hypertension Brother      Hypertension Brother      Coronary Artery Disease Brother        Social History  Social History     Socioeconomic History     Marital status:      Spouse name: Not on file     Number of children: Not on file     Years of education: Not on file     Highest education level: Not on file   Occupational History     Not on file   Social Needs     Financial resource strain: Not on file     Food insecurity:     Worry: Not on file     Inability: Not on file     Transportation needs:     Medical: Not  on file     Non-medical: Not on file   Tobacco Use     Smoking status: Former Smoker     Packs/day: 0.00     Types: Cigarettes     Smokeless tobacco: Never Used   Substance and Sexual Activity     Alcohol use: Yes     Comment: Occasional      Drug use: No     Sexual activity: Yes     Partners: Male   Lifestyle     Physical activity:     Days per week: Not on file     Minutes per session: Not on file     Stress: Not on file   Relationships     Social connections:     Talks on phone: Not on file     Gets together: Not on file     Attends L[employer] day service: Not on file     Active member of club or organization: Not on file     Attends meetings of clubs or organizations: Not on file     Relationship status: Not on file     Intimate partner violence:     Fear of current or ex partner: Not on file     Emotionally abused: Not on file     Physically abused: Not on file     Forced sexual activity: Not on file   Other Topics Concern     Parent/sibling w/ CABG, MI or angioplasty before 65F 55M? Not Asked   Social History Narrative     Not on file        Surgical History:  Past Surgical History:   Procedure Laterality Date     ARTHROSCOPY KNEE RT/LT Left 2010     ARTHROSCOPY KNEE WITH MENISCAL REPAIR Right 2017     HYSTERECTOMY, CERVIX STATUS UNKNOWN      with bladder sling     total knee Left 01/05/2012     TUBAL LIGATION  1989        Problem List:  Patient Active Problem List   Diagnosis     Depression     Osteoarthritis of knee     Hypertension     Hyperlipidemia     Tobacco use     Status post total left knee replacement     Colon polyps        Allergies:  Allergies   Allergen Reactions     Chantix [Varenicline] Rash     Lisinopril Cough     Strattera [Atomoxetine] Rash     Zoloft [Sertraline] Rash        Current Meds:    Current Outpatient Medications:      atorvastatin (LIPITOR) 20 MG tablet, Take 1 tablet (20 mg) by mouth daily, Disp: 90 tablet, Rfl: 3     hydrochlorothiazide (HYDRODIURIL) 50 MG tablet, Take 1 tablet (50  mg) by mouth daily, Disp: 30 tablet, Rfl: 3     losartan (COZAAR) 100 MG tablet, Take 1 tablet (100 mg) by mouth daily, Disp: 90 tablet, Rfl: 3     metoprolol (TOPROL-XL) 25 MG 24 hr tablet, Take 1 tablet (25 mg) by mouth daily, Disp: 90 tablet, Rfl: 3     OMEPRAZOLE PO, , Disp: , Rfl:      hydrochlorothiazide (HYDRODIURIL) 25 MG tablet, Take 1 tablet (25 mg) by mouth daily (Patient not taking: Reported on 10/20/2019), Disp: 90 tablet, Rfl: 3     traMADol (ULTRAM) 50 MG tablet, Take 1-2 tablets ( mg) by mouth every 6 hours as needed for pain maximum 6 tablet(s) per day (Patient not taking: Reported on 10/20/2019), Disp: 20 tablet, Rfl: 0     venlafaxine (EFFEXOR-XR) 37.5 MG 24 hr capsule, Take 1 capsule (37.5 mg) by mouth daily (Patient not taking: Reported on 10/20/2019), Disp: 90 capsule, Rfl: 3     PHYSICAL EXAM:     Vital signs noted and reviewed by Jean Carlos Tolentino PA-C  /78 (BP Location: Right arm, Patient Position: Chair, Cuff Size: Adult Regular)   Pulse 65   Temp 96.6  F (35.9  C) (Tympanic)   Wt 99.2 kg (218 lb 12.8 oz)   SpO2 99%   BMI 33.27 kg/m       PEFR:    Physical Exam  Vitals signs and nursing note reviewed.   Constitutional:       General: She is not in acute distress.     Appearance: She is well-developed. She is not ill-appearing, toxic-appearing or diaphoretic.   HENT:      Head: Normocephalic and atraumatic.      Right Ear: Tympanic membrane and external ear normal. No drainage, swelling or tenderness. Tympanic membrane is not perforated, erythematous, retracted or bulging.      Left Ear: Tympanic membrane and external ear normal. No drainage, swelling or tenderness. Tympanic membrane is not perforated, erythematous, retracted or bulging.      Nose: No mucosal edema, congestion or rhinorrhea.      Right Sinus: No maxillary sinus tenderness or frontal sinus tenderness.      Left Sinus: No maxillary sinus tenderness or frontal sinus tenderness.      Mouth/Throat:      Mouth: No  oral lesions.      Dentition: Abnormal dentition. Dental tenderness present. No dental abscesses.      Pharynx: No pharyngeal swelling, oropharyngeal exudate, posterior oropharyngeal erythema or uvula swelling.      Tonsils: No tonsillar abscesses.     Eyes:      Pupils: Pupils are equal, round, and reactive to light.   Neck:      Musculoskeletal: Full passive range of motion without pain, normal range of motion and neck supple.      Trachea: Trachea normal.   Cardiovascular:      Rate and Rhythm: Normal rate and regular rhythm.      Heart sounds: Normal heart sounds, S1 normal and S2 normal. No murmur. No friction rub. No gallop.    Pulmonary:      Effort: Pulmonary effort is normal. No respiratory distress.      Breath sounds: Normal breath sounds. No decreased breath sounds, wheezing, rhonchi or rales.   Abdominal:      General: Bowel sounds are normal. There is no distension.      Palpations: Abdomen is soft. Abdomen is not rigid. There is no mass.      Tenderness: There is no abdominal tenderness. There is no guarding or rebound.   Lymphadenopathy:      Cervical: No cervical adenopathy.   Skin:     General: Skin is warm and dry.   Neurological:      Mental Status: She is alert and oriented to person, place, and time.      Cranial Nerves: No cranial nerve deficit.      Deep Tendon Reflexes: Reflexes are normal and symmetric.   Psychiatric:         Behavior: Behavior normal. Behavior is cooperative.         Thought Content: Thought content normal.         Judgment: Judgment normal.          Labs:     No results found for any visits on 02/16/20.    Medical Decision Making:    Differential Diagnosis:  Dental pain, dental abscess.     ASSESSMENT:     1. Pain, dental    2. Essential hypertension           PLAN:     Rx for Augmentin today.  BP is well controlled in clinic today.  No medication change or follow up indicated at this time.  Patient instructed to follow up with dentist ASAP.  Worrisome symptoms discussed  with instructions to go to the ED.  Patient verbalized understanding and agreed with this plan.     Jean Carlos Tolentino PA-C  2/16/2020, 10:38 AM

## 2020-03-03 ENCOUNTER — OFFICE VISIT (OUTPATIENT)
Dept: FAMILY MEDICINE | Facility: CLINIC | Age: 55
End: 2020-03-03
Payer: COMMERCIAL

## 2020-03-03 VITALS
OXYGEN SATURATION: 97 % | DIASTOLIC BLOOD PRESSURE: 84 MMHG | SYSTOLIC BLOOD PRESSURE: 130 MMHG | BODY MASS INDEX: 32.89 KG/M2 | WEIGHT: 217 LBS | HEIGHT: 68 IN | RESPIRATION RATE: 18 BRPM | HEART RATE: 82 BPM | TEMPERATURE: 97.8 F

## 2020-03-03 DIAGNOSIS — K08.89 PAIN, DENTAL: Primary | ICD-10-CM

## 2020-03-03 PROCEDURE — 99213 OFFICE O/P EST LOW 20 MIN: CPT | Performed by: FAMILY MEDICINE

## 2020-03-03 RX ORDER — MULTIPLE VITAMINS W/ MINERALS TAB 9MG-400MCG
1 TAB ORAL DAILY
COMMUNITY

## 2020-03-03 ASSESSMENT — PATIENT HEALTH QUESTIONNAIRE - PHQ9
10. IF YOU CHECKED OFF ANY PROBLEMS, HOW DIFFICULT HAVE THESE PROBLEMS MADE IT FOR YOU TO DO YOUR WORK, TAKE CARE OF THINGS AT HOME, OR GET ALONG WITH OTHER PEOPLE: SOMEWHAT DIFFICULT
SUM OF ALL RESPONSES TO PHQ QUESTIONS 1-9: 13
SUM OF ALL RESPONSES TO PHQ QUESTIONS 1-9: 13

## 2020-03-03 ASSESSMENT — ANXIETY QUESTIONNAIRES
1. FEELING NERVOUS, ANXIOUS, OR ON EDGE: NEARLY EVERY DAY
7. FEELING AFRAID AS IF SOMETHING AWFUL MIGHT HAPPEN: NEARLY EVERY DAY
GAD7 TOTAL SCORE: 21
4. TROUBLE RELAXING: NEARLY EVERY DAY
7. FEELING AFRAID AS IF SOMETHING AWFUL MIGHT HAPPEN: NEARLY EVERY DAY
GAD7 TOTAL SCORE: 21
6. BECOMING EASILY ANNOYED OR IRRITABLE: NEARLY EVERY DAY
5. BEING SO RESTLESS THAT IT IS HARD TO SIT STILL: NEARLY EVERY DAY
GAD7 TOTAL SCORE: 21
2. NOT BEING ABLE TO STOP OR CONTROL WORRYING: NEARLY EVERY DAY
3. WORRYING TOO MUCH ABOUT DIFFERENT THINGS: NEARLY EVERY DAY

## 2020-03-03 ASSESSMENT — MIFFLIN-ST. JEOR: SCORE: 1637.81

## 2020-03-03 NOTE — PROGRESS NOTES
SUBJECTIVE   Yaneli Webster is a 54 year old female who presents with     Dental issues      Duration: Yesterday     Description (location/character/radiation): Dental- lower right side.     Intensity:  moderate    Accompanying signs and symptoms: is dealing with getting dentures- next appt is in 2 weeks     History (similar episodes/previous evaluation): Dental carries     Precipitating or alleviating factors: None    Therapies tried and outcome: numbing medication, tylenol, ibuprofen        PCP   [facility] [phone]    Health Maintenance        Health Maintenance Due   Topic Date Due     PREVENTIVE CARE VISIT  1966     HIV SCREENING  12/06/1981     MAMMO SCREENING  06/08/2019     INFLUENZA VACCINE (1) 09/01/2019     ZOSTER IMMUNIZATION (2 of 2) 03/06/2020       HPI        Patient Active Problem List   Diagnosis     Depression     Osteoarthritis of knee     Hypertension     Hyperlipidemia     Tobacco use     Status post total left knee replacement     Colon polyps     Current Outpatient Medications   Medication     atorvastatin (LIPITOR) 20 MG tablet     hydrochlorothiazide (HYDRODIURIL) 50 MG tablet     losartan (COZAAR) 100 MG tablet     metoprolol (TOPROL-XL) 25 MG 24 hr tablet     multivitamin w/minerals (MULTI-VITAMIN) tablet     OMEPRAZOLE PO     No current facility-administered medications for this visit.        Patient Active Problem List   Diagnosis     Depression     Osteoarthritis of knee     Hypertension     Hyperlipidemia     Tobacco use     Status post total left knee replacement     Colon polyps     Past Surgical History:   Procedure Laterality Date     ARTHROSCOPY KNEE RT/LT Left 2010     ARTHROSCOPY KNEE WITH MENISCAL REPAIR Right 2017     HYSTERECTOMY, CERVIX STATUS UNKNOWN      with bladder sling     HYSTERECTOMY, PAP NO LONGER INDICATED       total knee Left 01/05/2012     TUBAL LIGATION  1989       Social History     Tobacco Use     Smoking status: Former Smoker      Packs/day: 0.00     Types: Cigarettes     Smokeless tobacco: Never Used   Substance Use Topics     Alcohol use: Yes     Comment: Occasional      Family History   Problem Relation Age of Onset     Crohn's Disease Mother      Diabetes Father      Hypertension Brother      Hypertension Sister      Hypertension Brother      Hypertension Brother      Coronary Artery Disease Brother          Current Outpatient Medications   Medication Sig Dispense Refill     amoxicillin-clavulanate (AUGMENTIN) 875-125 MG tablet Take 1 tablet by mouth 2 times daily for 10 days 20 tablet 0     atorvastatin (LIPITOR) 20 MG tablet Take 1 tablet (20 mg) by mouth daily 90 tablet 3     hydrochlorothiazide (HYDRODIURIL) 50 MG tablet Take 1 tablet (50 mg) by mouth daily 30 tablet 3     losartan (COZAAR) 100 MG tablet Take 1 tablet (100 mg) by mouth daily 90 tablet 3     metoprolol (TOPROL-XL) 25 MG 24 hr tablet Take 1 tablet (25 mg) by mouth daily 90 tablet 3     multivitamin w/minerals (MULTI-VITAMIN) tablet Take 1 tablet by mouth daily       OMEPRAZOLE PO        Allergies   Allergen Reactions     Chantix [Varenicline] Rash     Lisinopril Cough     Adhesive Tape Rash     Medical tape. Patch for smoking.     Strattera [Atomoxetine] Rash     Zoloft [Sertraline] Rash     Recent Labs   Lab Test 06/13/17  1046   *   HDL 40*   TRIG 202*   ALT 33   CR 0.78   GFRESTIMATED 78   GFRESTBLACK >90   GFR Calc     POTASSIUM 4.2   TSH 1.32      BP Readings from Last 3 Encounters:   03/03/20 130/84   02/16/20 122/78   10/20/19 116/81    Wt Readings from Last 3 Encounters:   03/03/20 98.4 kg (217 lb)   02/16/20 99.2 kg (218 lb 12.8 oz)   08/24/17 96.2 kg (212 lb)                    Reviewed and updated:  Tobacco  Allergies  Meds  Med Hx  Surg Hx  Fam Hx  Soc Hx     ROS:  Constitutional, HEENT, cardiovascular, pulmonary, gi and gu systems are negative, except as otherwise noted.    PHYSICAL EXAM   /84 (Cuff Size: Adult Regular)    "Pulse 82   Temp 97.8  F (36.6  C) (Tympanic)   Resp 18   Ht 1.727 m (5' 8\")   Wt 98.4 kg (217 lb)   SpO2 97%   Breastfeeding No   BMI 32.99 kg/m    Body mass index is 32.99 kg/m .  GENERAL: alert and no distress  EYES: Eyes grossly normal to inspection, PERRL and conjunctivae and sclerae normal  HENT: normal cephalic/atraumatic, ear canals and TM's normal, oral mucous membranes moist and right lower second incisor and canine with tooth decay with some periapical fullness, no bleeding or discharge noted  NECK: no adenopathy, no asymmetry, masses, or scars and thyroid normal to palpation  RESP: lungs clear to auscultation - no rales, rhonchi or wheezes  CV: regular rates and rhythm, normal S1 S2, no S3 or S4 and no murmur, click or rub    Assessment & Plan     (K08.89) Pain, dental  (primary encounter diagnosis)  Comment: Suspect symptoms secondary to dental infection.  Augmentin prescribed, common side effects discussed.  Suggested continue well hydration, over-the-counter analgesia.  Patient will be following dentist as well.  Stressed on smoking cessation.  All questions answered  Plan: amoxicillin-clavulanate (AUGMENTIN) 875-125 MG         tablet        Patient Instructions     Patient Education     Dental Pain    A crack or cavity in a tooth can cause tooth pain. This is because the crack or cavity exposes the sensitive inner area of the tooth. An infection in the gum or the root of the tooth can cause pain and swelling. The pain is often made worse when you drink hot or cold beverages. It can also be worse when you bite on hard foods. Pain may spread from the tooth to your ear or the area of the jaw on the same side.  Home care  Follow these tips when caring for yourself at home:    Don't have hot and cold foods and drinks. Your tooth may be sensitive to changes in temperature.    Use toothpaste made for sensitive teeth. Brush gently up and down instead of sideways. Brushing sideways can wear away root " "surfaces if they are exposed.    If your tooth is chipped or cracked, or if there is a large open cavity, put oil of cloves directly on the tooth to relieve pain. You can buy oil of cloves at drugstores. Some pharmacies carry an over-the-counter \"toothache kit.\" This contains a paste that you can put on the exposed tooth to make it less sensitive.    Put a cold pack on your jaw over the sore area to help reduce pain.    You may use over-the-counter medicine to ease pain, unless your doctor prescribed another medicine. If you have chronic liver or kidney disease, talk with your healthcare provider before using acetaminophen or ibuprofen. Also talk with your provider if you ve had a stomach ulcer or GI bleeding.    If you have signs of an infection, you will be given an antibiotic. Take it as directed.  Follow-up care  Follow up with your dentist, or as advised. Your pain may go away with the treatment given today. But only a dentist can fully look at and treat the cause of your pain. This will keep the pain from coming back.  Call 911  Call 911 if any of these occur:    Unusual drowsiness    Headache or stiff neck    Weakness or fainting    Difficulty swallowing or breathing  When to seek medical advice  Call your health care provider right away if any of these occur:    Your face becomes swollen or red    Pain gets worse or spreads to your neck    Fever of 100.4  F (38.0  C) or higher, or as directed by your healthcare provider    Pus drains from the tooth  Date Last Reviewed: 10/1/2016    3230-5908 [employer]. [address] [place] [address], [place] [id number]. All rights reserved. This information is not intended as a substitute for professional medical care. Always follow your healthcare professional's instructions.               Joo Sow MD  [facility]      "

## 2020-03-03 NOTE — PATIENT INSTRUCTIONS
"  Patient Education     Dental Pain    A crack or cavity in a tooth can cause tooth pain. This is because the crack or cavity exposes the sensitive inner area of the tooth. An infection in the gum or the root of the tooth can cause pain and swelling. The pain is often made worse when you drink hot or cold beverages. It can also be worse when you bite on hard foods. Pain may spread from the tooth to your ear or the area of the jaw on the same side.  Home care  Follow these tips when caring for yourself at home:    Don't have hot and cold foods and drinks. Your tooth may be sensitive to changes in temperature.    Use toothpaste made for sensitive teeth. Brush gently up and down instead of sideways. Brushing sideways can wear away root surfaces if they are exposed.    If your tooth is chipped or cracked, or if there is a large open cavity, put oil of cloves directly on the tooth to relieve pain. You can buy oil of cloves at drugstores. Some pharmacies carry an over-the-counter \"toothache kit.\" This contains a paste that you can put on the exposed tooth to make it less sensitive.    Put a cold pack on your jaw over the sore area to help reduce pain.    You may use over-the-counter medicine to ease pain, unless your doctor prescribed another medicine. If you have chronic liver or kidney disease, talk with your healthcare provider before using acetaminophen or ibuprofen. Also talk with your provider if you ve had a stomach ulcer or GI bleeding.    If you have signs of an infection, you will be given an antibiotic. Take it as directed.  Follow-up care  Follow up with your dentist, or as advised. Your pain may go away with the treatment given today. But only a dentist can fully look at and treat the cause of your pain. This will keep the pain from coming back.  Call 911  Call 911 if any of these occur:    Unusual drowsiness    Headache or stiff neck    Weakness or fainting    Difficulty swallowing or breathing  When to seek " medical advice  Call your health care provider right away if any of these occur:    Your face becomes swollen or red    Pain gets worse or spreads to your neck    Fever of 100.4  F (38.0  C) or higher, or as directed by your healthcare provider    Pus drains from the tooth  Date Last Reviewed: 10/1/2016    1096-5330 [email or web address]. [address] [place] [address]. All rights reserved. This information is not intended as a substitute for professional medical care. Always follow your healthcare professional's instructions.

## 2020-03-04 ASSESSMENT — ANXIETY QUESTIONNAIRES: GAD7 TOTAL SCORE: 21

## 2020-03-20 ENCOUNTER — OFFICE VISIT (OUTPATIENT)
Dept: URGENT CARE | Facility: URGENT CARE | Age: 55
End: 2020-03-20
Payer: COMMERCIAL

## 2020-03-20 VITALS
SYSTOLIC BLOOD PRESSURE: 126 MMHG | TEMPERATURE: 98 F | DIASTOLIC BLOOD PRESSURE: 64 MMHG | WEIGHT: 217 LBS | RESPIRATION RATE: 16 BRPM | BODY MASS INDEX: 32.99 KG/M2

## 2020-03-20 DIAGNOSIS — K08.89 TOOTH PAIN: Primary | ICD-10-CM

## 2020-03-20 PROCEDURE — 99213 OFFICE O/P EST LOW 20 MIN: CPT | Performed by: PHYSICIAN ASSISTANT

## 2020-03-20 RX ORDER — HYDROCODONE BITARTRATE AND ACETAMINOPHEN 5; 325 MG/1; MG/1
1 TABLET ORAL EVERY 4 HOURS PRN
Qty: 10 TABLET | Refills: 0 | Status: SHIPPED | OUTPATIENT
Start: 2020-03-20 | End: 2020-03-21

## 2020-03-20 ASSESSMENT — ENCOUNTER SYMPTOMS
FEVER: 0
FACIAL SWELLING: 1
SORE THROAT: 0
COUGH: 0
SINUS PAIN: 0

## 2020-03-20 ASSESSMENT — PAIN SCALES - GENERAL: PAINLEVEL: WORST PAIN (10)

## 2020-03-20 NOTE — NURSING NOTE
"Chief Complaint   Patient presents with     Dental Pain     started over night. Front lower.  Is in process of getting implants, though procedure placed on hold.  Had one left over amoxiciliin that was tried to see if would help.  Has been trying anything to get pain under control.         Initial /64 (BP Location: Right arm, Patient Position: Chair, Cuff Size: Adult Large)   Temp 98  F (36.7  C) (Tympanic)   Resp 16   Wt 98.4 kg (217 lb)   BMI 32.99 kg/m   Estimated body mass index is 32.99 kg/m  as calculated from the following:    Height as of 3/3/20: 1.727 m (5' 8\").    Weight as of this encounter: 98.4 kg (217 lb).    Patient presents to the clinic using No DME    Health Maintenance that is potentially due pending provider review:  NONE    n/a    Is there anyone who you would like to be able to receive your results? No  If yes have patient fill out [facility]  Izaiah Roque M.A.        "

## 2020-03-21 NOTE — PROGRESS NOTES
SUBJECTIVE:   Yaneli Webster is a 54 year old female presenting with a chief complaint of   Chief Complaint   Patient presents with     Dental Pain     started over night. Front lower.  Is in process of getting implants, though procedure placed on hold.  Had one left over amoxiciliin that was tried to see if would help.  Has been trying anything to get pain under control.         She is an established patient of [facility].    Tooth pain. Lower mid - had been struggling with infection in past. Pain worsening. No fevers      Review of Systems   Constitutional: Negative for fever.   HENT: Positive for facial swelling. Negative for mouth sores, sinus pain and sore throat.    Respiratory: Negative for cough.        History reviewed. No pertinent past medical history.  Family History   Problem Relation Age of Onset     Crohn's Disease Mother      Diabetes Father      Hypertension Brother      Hypertension Sister      Hypertension Brother      Hypertension Brother      Coronary Artery Disease Brother      Current Outpatient Medications   Medication Sig Dispense Refill     amoxicillin-clavulanate (AUGMENTIN) 875-125 MG tablet Take 1 tablet by mouth 2 times daily for 7 days 14 tablet 0     atorvastatin (LIPITOR) 20 MG tablet Take 1 tablet (20 mg) by mouth daily 90 tablet 3     hydrochlorothiazide (HYDRODIURIL) 50 MG tablet Take 1 tablet (50 mg) by mouth daily 30 tablet 3     HYDROcodone-acetaminophen (NORCO) 5-325 MG tablet Take 1 tablet by mouth every 4 hours as needed for severe pain 10 tablet 0     losartan (COZAAR) 100 MG tablet Take 1 tablet (100 mg) by mouth daily 90 tablet 3     metoprolol (TOPROL-XL) 25 MG 24 hr tablet Take 1 tablet (25 mg) by mouth daily 90 tablet 3     multivitamin w/minerals (MULTI-VITAMIN) tablet Take 1 tablet by mouth daily       OMEPRAZOLE PO        Social History     Tobacco Use     Smoking status: Former Smoker     Packs/day: 0.00     Types: Cigarettes     Smokeless tobacco: Never Used    Substance Use Topics     Alcohol use: Yes     Comment: Occasional        OBJECTIVE  /64 (BP Location: Right arm, Patient Position: Chair, Cuff Size: Adult Large)   Temp 98  F (36.7  C) (Tympanic)   Resp 16   Wt 98.4 kg (217 lb)   BMI 32.99 kg/m      Physical Exam  Vitals signs and nursing note reviewed.   Constitutional:       General: She is in acute distress.      Appearance: Normal appearance.   HENT:      Head: Atraumatic.      Nose: Nose normal.      Mouth/Throat:      Mouth: Mucous membranes are moist.      Dentition: Dental tenderness and gingival swelling present.      Tongue: No lesions. Tongue does not deviate from midline.      Palate: No mass and lesions.      Pharynx: Oropharynx is clear. Uvula midline.   Neck:      Musculoskeletal: Full passive range of motion without pain.   Lymphadenopathy:      Cervical: Cervical adenopathy present.   Neurological:      Mental Status: She is alert.         Labs:  No results found for this or any previous visit (from the past 24 hour(s)).    X-Ray was not done.    ASSESSMENT:      ICD-10-CM    1. Tooth pain  K08.89 amoxicillin-clavulanate (AUGMENTIN) 875-125 MG tablet     HYDROcodone-acetaminophen (NORCO) 5-325 MG tablet        Medical Decision Making:    Dental abscess   Tooth decay    Serious Comorbid Conditions:  Adult:  None    PLAN:    Augmentin bid for 7 days  Ok 10 tab [place].   Follow up dentist.     Followup:    If not improving or if condition worsens, follow up with your Primary Care Provider    There are no Patient Instructions on file for this visit.

## 2020-10-02 ENCOUNTER — OFFICE VISIT (OUTPATIENT)
Dept: URGENT CARE | Facility: URGENT CARE | Age: 55
End: 2020-10-02
Payer: COMMERCIAL

## 2020-10-02 ENCOUNTER — ANCILLARY PROCEDURE (OUTPATIENT)
Dept: GENERAL RADIOLOGY | Facility: CLINIC | Age: 55
End: 2020-10-02
Attending: PHYSICIAN ASSISTANT
Payer: COMMERCIAL

## 2020-10-02 VITALS
RESPIRATION RATE: 16 BRPM | BODY MASS INDEX: 31.52 KG/M2 | HEART RATE: 83 BPM | WEIGHT: 208 LBS | HEIGHT: 68 IN | SYSTOLIC BLOOD PRESSURE: 128 MMHG | OXYGEN SATURATION: 96 % | DIASTOLIC BLOOD PRESSURE: 88 MMHG | TEMPERATURE: 97.4 F

## 2020-10-02 DIAGNOSIS — M79.672 LEFT FOOT PAIN: ICD-10-CM

## 2020-10-02 DIAGNOSIS — M79.672 LEFT FOOT PAIN: Primary | ICD-10-CM

## 2020-10-02 PROCEDURE — 99214 OFFICE O/P EST MOD 30 MIN: CPT | Performed by: PHYSICIAN ASSISTANT

## 2020-10-02 PROCEDURE — 73630 X-RAY EXAM OF FOOT: CPT | Mod: LT | Performed by: RADIOLOGY

## 2020-10-02 RX ORDER — NAPROXEN 500 MG/1
500 TABLET ORAL 2 TIMES DAILY WITH MEALS
Qty: 60 TABLET | Refills: 1 | Status: SHIPPED | OUTPATIENT
Start: 2020-10-02

## 2020-10-02 ASSESSMENT — MIFFLIN-ST. JEOR: SCORE: 1596.98

## 2020-10-02 NOTE — NURSING NOTE
"Initial /88   Pulse 83   Temp 97.4  F (36.3  C) (Tympanic)   Resp 16   Ht 1.727 m (5' 8\")   Wt 94.3 kg (208 lb)   SpO2 96%   BMI 31.63 kg/m   Estimated body mass index is 31.63 kg/m  as calculated from the following:    Height as of this encounter: 1.727 m (5' 8\").    Weight as of this encounter: 94.3 kg (208 lb). .      "

## 2020-10-02 NOTE — PROGRESS NOTES
SUBJECTIVE:   Yaneli Webster is a 54 year old female presenting with a chief complaint of   Chief Complaint   Patient presents with     Musculoskeletal Problem       She is an established patient of [facility].    MS Injury/Pain    Onset of symptoms was 1.5 week(s) ago.  Location: left foot  Context:       The injury happened while at home      Mechanism: no known injury       Patient experienced delayed pain  Course of symptoms is worsening.    Severity moderate  Current and Associated symptoms: Pain  Denies  Warmth and Redness  Aggravating Factors: walking, weight-bearing, movement and flexion/extension  Therapies to improve symptoms include: ibuprofen  This is the first time this type of problem has occurred for this patient.       Review of Systems   Constitutional: Negative for chills, fatigue, fever and unexpected weight change.   HENT: Negative for congestion, ear pain, postnasal drip, rhinorrhea, sinus pressure, sore throat and trouble swallowing.    Eyes: Negative for pain, redness and visual disturbance.   Respiratory: Negative for cough, chest tightness, shortness of breath and wheezing.    Cardiovascular: Negative for chest pain and palpitations.   Gastrointestinal: Negative for abdominal pain, diarrhea, nausea and vomiting.   Musculoskeletal: Negative for arthralgias, back pain and myalgias.        Left foot pain    Skin: Negative for rash.       History reviewed. No pertinent past medical history.  Family History   Problem Relation Age of Onset     Crohn's Disease Mother      Diabetes Father      Hypertension Brother      Hypertension Sister      Hypertension Brother      Hypertension Brother      Coronary Artery Disease Brother      Current Outpatient Medications   Medication Sig Dispense Refill     atorvastatin (LIPITOR) 20 MG tablet Take 1 tablet (20 mg) by mouth daily 90 tablet 3     hydrochlorothiazide (HYDRODIURIL) 50 MG tablet Take 1 tablet (50 mg) by mouth daily 30 tablet 3     losartan (COZAAR)  "100 MG tablet Take 1 tablet (100 mg) by mouth daily 90 tablet 3     metoprolol (TOPROL-XL) 25 MG 24 hr tablet Take 1 tablet (25 mg) by mouth daily 90 tablet 3     multivitamin w/minerals (MULTI-VITAMIN) tablet Take 1 tablet by mouth daily       naproxen (NAPROSYN) 500 MG tablet Take 1 tablet (500 mg) by mouth 2 times daily (with meals) 60 tablet 1     OMEPRAZOLE PO        Social History     Tobacco Use     Smoking status: Current Every Day Smoker     Packs/day: 0.50     Types: Cigarettes     Smokeless tobacco: Never Used   Substance Use Topics     Alcohol use: Yes     Comment: Occasional        OBJECTIVE  /88   Pulse 83   Temp 97.4  F (36.3  C) (Tympanic)   Resp 16   Ht 1.727 m (5' 8\")   Wt 94.3 kg (208 lb)   SpO2 96%   BMI 31.63 kg/m      Physical Exam  Constitutional:       General: She is not in acute distress.  Musculoskeletal:      Comments: Bottom of left foot has a circular area that is swollen and tender to the touch. No redness, warmth or bruising. Painful active ROM. Good capillary refill.    Skin:     General: Skin is warm and dry.   Neurological:      Mental Status: She is alert.   Psychiatric:         Speech: Speech normal.         Behavior: Behavior normal.         Labs:  No results found for this or any previous visit (from the past 24 hour(s)).    X-Ray was done, my findings are: no acute fracture or bone spurs     ASSESSMENT:      ICD-10-CM    1. Left foot pain  M79.672 XR Foot Left G/E 3 Views     naproxen (NAPROSYN) 500 MG tablet     Orthopedic & Spine  Referral        Medical Decision Making:    Differential Diagnosis:  MS Injury Pain: sprain, fracture, tendonitis, muscle strain, contusion and bone spur     Serious Comorbid Conditions:  Adult:  None    PLAN:    MS Injury/Pain  ice, elevate, rest, stretching, Tylenol and naproxen two times daily x 5-10 days. Would also recommend over the counter orthotics. Return to clinic if symptoms worsen or do not improve; otherwise " follow up as needed      Followup:    Patient requests to follow up with podiatry if symptoms worsen or do not improve.

## 2020-10-06 ASSESSMENT — ENCOUNTER SYMPTOMS
WHEEZING: 0
COUGH: 0
SHORTNESS OF BREATH: 0
BACK PAIN: 0
SINUS PRESSURE: 0
VOMITING: 0
RHINORRHEA: 0
ABDOMINAL PAIN: 0
TROUBLE SWALLOWING: 0
ARTHRALGIAS: 0
MYALGIAS: 0
EYE REDNESS: 0
DIARRHEA: 0
UNEXPECTED WEIGHT CHANGE: 0
SORE THROAT: 0
EYE PAIN: 0
FEVER: 0
FATIGUE: 0
NAUSEA: 0
CHILLS: 0
PALPITATIONS: 0
CHEST TIGHTNESS: 0

## 2020-10-12 ENCOUNTER — HOSPITAL ENCOUNTER (EMERGENCY)
Facility: CLINIC | Age: 55
Discharge: HOME | End: 2020-10-12
Attending: PHYSICIAN ASSISTANT | Admitting: PHYSICIAN ASSISTANT
Payer: COMMERCIAL

## 2020-10-12 ENCOUNTER — APPOINTMENT (OUTPATIENT)
Dept: CT IMAGING | Facility: CLINIC | Age: 55
End: 2020-10-12
Attending: PHYSICIAN ASSISTANT
Payer: COMMERCIAL

## 2020-10-12 ENCOUNTER — NURSE TRIAGE (OUTPATIENT)
Dept: NURSING | Facility: CLINIC | Age: 55
End: 2020-10-12

## 2020-10-12 VITALS
RESPIRATION RATE: 20 BRPM | DIASTOLIC BLOOD PRESSURE: 91 MMHG | BODY MASS INDEX: 31.63 KG/M2 | OXYGEN SATURATION: 95 % | TEMPERATURE: 97 F | SYSTOLIC BLOOD PRESSURE: 150 MMHG | WEIGHT: 208 LBS | HEART RATE: 75 BPM

## 2020-10-12 DIAGNOSIS — R07.1 PAINFUL RESPIRATION: ICD-10-CM

## 2020-10-12 DIAGNOSIS — J90 PLEURAL EFFUSION: ICD-10-CM

## 2020-10-12 DIAGNOSIS — R91.1 PULMONARY NODULE: ICD-10-CM

## 2020-10-12 LAB
ANION GAP SERPL CALCULATED.3IONS-SCNC: 7 MMOL/L (ref 3–14)
BASOPHILS # BLD AUTO: 0.1 10E9/L (ref 0–0.2)
BASOPHILS NFR BLD AUTO: 0.5 %
BUN SERPL-MCNC: 13 MG/DL (ref 7–30)
CALCIUM SERPL-MCNC: 9.2 MG/DL (ref 8.5–10.1)
CHLORIDE SERPL-SCNC: 102 MMOL/L (ref 94–109)
CO2 SERPL-SCNC: 28 MMOL/L (ref 20–32)
CREAT SERPL-MCNC: 0.92 MG/DL (ref 0.52–1.04)
D DIMER PPP FEU-MCNC: 0.8 UG/ML FEU (ref 0–0.5)
DIFFERENTIAL METHOD BLD: NORMAL
EOSINOPHIL # BLD AUTO: 0.2 10E9/L (ref 0–0.7)
EOSINOPHIL NFR BLD AUTO: 2.3 %
ERYTHROCYTE [DISTWIDTH] IN BLOOD BY AUTOMATED COUNT: 12.1 % (ref 10–15)
GFR SERPL CREATININE-BSD FRML MDRD: 71 ML/MIN/{1.73_M2}
GLUCOSE SERPL-MCNC: 99 MG/DL (ref 70–99)
HCT VFR BLD AUTO: 42.7 % (ref 35–47)
HGB BLD-MCNC: 14.9 G/DL (ref 11.7–15.7)
IMM GRANULOCYTES # BLD: 0 10E9/L (ref 0–0.4)
IMM GRANULOCYTES NFR BLD: 0.3 %
LYMPHOCYTES # BLD AUTO: 2 10E9/L (ref 0.8–5.3)
LYMPHOCYTES NFR BLD AUTO: 19.6 %
MCH RBC QN AUTO: 30.9 PG (ref 26.5–33)
MCHC RBC AUTO-ENTMCNC: 34.9 G/DL (ref 31.5–36.5)
MCV RBC AUTO: 89 FL (ref 78–100)
MONOCYTES # BLD AUTO: 0.6 10E9/L (ref 0–1.3)
MONOCYTES NFR BLD AUTO: 6.1 %
NEUTROPHILS # BLD AUTO: 7.4 10E9/L (ref 1.6–8.3)
NEUTROPHILS NFR BLD AUTO: 71.2 %
NRBC # BLD AUTO: 0 10*3/UL
NRBC BLD AUTO-RTO: 0 /100
PLATELET # BLD AUTO: 229 10E9/L (ref 150–450)
POTASSIUM SERPL-SCNC: 3.6 MMOL/L (ref 3.4–5.3)
RBC # BLD AUTO: 4.82 10E12/L (ref 3.8–5.2)
SODIUM SERPL-SCNC: 137 MMOL/L (ref 133–144)
TROPONIN I SERPL-MCNC: <0.015 UG/L (ref 0–0.04)
WBC # BLD AUTO: 10.4 10E9/L (ref 4–11)

## 2020-10-12 PROCEDURE — 93005 ELECTROCARDIOGRAM TRACING: CPT | Performed by: PHYSICIAN ASSISTANT

## 2020-10-12 PROCEDURE — 85379 FIBRIN DEGRADATION QUANT: CPT | Performed by: PHYSICIAN ASSISTANT

## 2020-10-12 PROCEDURE — 99285 EMERGENCY DEPT VISIT HI MDM: CPT | Mod: 25 | Performed by: PHYSICIAN ASSISTANT

## 2020-10-12 PROCEDURE — U0003 INFECTIOUS AGENT DETECTION BY NUCLEIC ACID (DNA OR RNA); SEVERE ACUTE RESPIRATORY SYNDROME CORONAVIRUS 2 (SARS-COV-2) (CORONAVIRUS DISEASE [COVID-19]), AMPLIFIED PROBE TECHNIQUE, MAKING USE OF HIGH THROUGHPUT TECHNOLOGIES AS DESCRIBED BY CMS-2020-01-R: HCPCS | Performed by: PHYSICIAN ASSISTANT

## 2020-10-12 PROCEDURE — 80048 BASIC METABOLIC PNL TOTAL CA: CPT | Performed by: PHYSICIAN ASSISTANT

## 2020-10-12 PROCEDURE — 250N000009 HC RX 250: Performed by: PHYSICIAN ASSISTANT

## 2020-10-12 PROCEDURE — 96374 THER/PROPH/DIAG INJ IV PUSH: CPT | Mod: 59 | Performed by: PHYSICIAN ASSISTANT

## 2020-10-12 PROCEDURE — 85025 COMPLETE CBC W/AUTO DIFF WBC: CPT | Performed by: PHYSICIAN ASSISTANT

## 2020-10-12 PROCEDURE — C9803 HOPD COVID-19 SPEC COLLECT: HCPCS | Performed by: PHYSICIAN ASSISTANT

## 2020-10-12 PROCEDURE — 250N000011 HC RX IP 250 OP 636: Performed by: PHYSICIAN ASSISTANT

## 2020-10-12 PROCEDURE — 93010 ELECTROCARDIOGRAM REPORT: CPT | Performed by: PHYSICIAN ASSISTANT

## 2020-10-12 PROCEDURE — 71275 CT ANGIOGRAPHY CHEST: CPT

## 2020-10-12 PROCEDURE — 84484 ASSAY OF TROPONIN QUANT: CPT | Performed by: PHYSICIAN ASSISTANT

## 2020-10-12 RX ORDER — IOPAMIDOL 755 MG/ML
85 INJECTION, SOLUTION INTRAVASCULAR ONCE
Status: COMPLETED | OUTPATIENT
Start: 2020-10-12 | End: 2020-10-12

## 2020-10-12 RX ORDER — KETOROLAC TROMETHAMINE 15 MG/ML
15 INJECTION, SOLUTION INTRAMUSCULAR; INTRAVENOUS ONCE
Status: COMPLETED | OUTPATIENT
Start: 2020-10-12 | End: 2020-10-12

## 2020-10-12 RX ADMIN — KETOROLAC TROMETHAMINE 15 MG: 15 INJECTION, SOLUTION INTRAMUSCULAR; INTRAVENOUS at 19:19

## 2020-10-12 RX ADMIN — IOPAMIDOL 85 ML: 755 INJECTION, SOLUTION INTRAVENOUS at 19:36

## 2020-10-12 RX ADMIN — SODIUM CHLORIDE 99 ML: 9 INJECTION, SOLUTION INTRAVENOUS at 19:36

## 2020-10-12 ASSESSMENT — ENCOUNTER SYMPTOMS
NAUSEA: 0
FEVER: 0
CHILLS: 0
DIZZINESS: 0
NECK PAIN: 0
FREQUENCY: 0
COLOR CHANGE: 0
BACK PAIN: 0
DIARRHEA: 0
SHORTNESS OF BREATH: 0
HEMATURIA: 0
EYE REDNESS: 0
WHEEZING: 0
EYE DISCHARGE: 0
COUGH: 0
EYE ITCHING: 0
FLANK PAIN: 0
LIGHT-HEADEDNESS: 0
VOMITING: 0
DYSURIA: 0
ABDOMINAL PAIN: 0
WOUND: 0
SORE THROAT: 1

## 2020-10-12 NOTE — TELEPHONE ENCOUNTER
Triage Call:    Patient presented with chest pain this morning that is a new sx.  Chest pain is constant and occurs with each breath.  When bending over patient gets chest pain as well.  No SOB currently.   Patient noted to have shoulder pain earlier this morning that had subsided.  Patient is currently a smoker.    Per protocol recommended to go to ED now. Patient states she is safe to drive, her  would not be able to drive patient until he gets back from work tonight at 7pm. Patient stated she could wait until her  is back, but RN stated patient needs to go to ED now. RN advised if patient feels unsafe to drive to call 911. Patient verbalized understanding and agrees with plan.     Lety Rushing RN, BSN Nurse Triage Advisor 2:50 PM 10/12/2020     COVID 19 Nurse Triage Plan/Patient Instructions    Please be aware that novel coronavirus (COVID-19) may be circulating in the community. If you develop symptoms such as fever, cough, or SOB or if you have concerns about the presence of another infection including coronavirus (COVID-19), please contact your health care provider or visit [email or web address].     Disposition/Instructions    ED Visit recommended. Follow protocol based instructions.     Bring Your Own Device:  Please also bring your smart device(s) (smart phones, tablets, laptops) and their charging cables for your personal use and to communicate with your care team during your visit.    Thank you for taking steps to prevent the spread of this virus.  o Limit your contact with others.  o Wear a simple mask to cover your cough.  o Wash your hands well and often.    Resources    [facility]: About COVID-19: [email or web address]    CDC: What to Do If You're Sick: www.cdc.gov/coronavirus/2019-ncov/about/steps-when-sick.html    CDC: Ending Home Isolation: www.cdc.gov/coronavirus/2019-ncov/hcp/disposition-in-home-patients.html     CDC: Caring for Someone:  www.cdc.gov/coronavirus/2019-ncov/if-you-are-sick/care-for-someone.html     [facility]: Interim Guidance for Hospital Discharge to Home: www.health.[facility].[place]/diseases/coronavirus/hcp/hospdischarge.pdf    [facility] clinical trials (COVID-19 research studies): [email or web address].[email or web address]/umn-clinical-trials     Below are the COVID-19 hotlines at the [place] [facility] of [facility] ([facility]). Interpreters are available.   o For health questions: Call 721-012-6915 or 1-890.963.9102 (7 a.m. to 7 p.m.)  o For questions about schools and childcare: Call 847-824-7769 or 1-913.487.2121 (7 a.m. to 7 p.m.)       Reason for Disposition    SEVERE chest pain    Pain also present in shoulder(s) or arm(s) or jaw    Additional Information    Negative: Severe difficulty breathing (e.g., struggling for each breath, speaks in single words)    Negative: Passed out (i.e., fainted, collapsed and was not responding)    Negative: Chest pain lasting longer than 5 minutes and ANY of the following:* Over 50 years old* Over 30 years old and at least one cardiac risk factor (i.e., high blood pressure, diabetes, high cholesterol, obesity, smoker or strong family history of heart disease)* Pain is crushing, pressure-like, or heavy * Took nitroglycerin and chest pain was not relieved* History of heart disease (i.e., angina, heart attack, bypass surgery, angioplasty, CHF)    Negative: Visible sweat on face or sweat dripping down face    Negative: Sounds like a life-threatening emergency to the triager    Negative: Followed an injury to chest    Negative: Difficulty breathing    Protocols used: CHEST PAIN-A-[place]

## 2020-10-12 NOTE — ED AVS SNAPSHOT
[facility] Emergency Dept  [address] [address]  Phone: [phone]  Fax: [phone]                                    Yaneli Webster   MRN: [id number]    Department: [facility] Emergency Dept   Date of Visit: 10/12/2020           After Visit Summary Signature Page    I have received my discharge instructions, and my questions have been answered. I have discussed any challenges I see with this plan with the nurse or doctor.    ..........................................................................................................................................  Patient/Patient Representative Signature      ..........................................................................................................................................  Patient Representative Print Name and Relationship to Patient    ..................................................               ................................................  Date                                   Time    ..........................................................................................................................................  Reviewed by Signature/Title    ...................................................              ..............................................  Date                                               Time          22EPIC Rev 08/18

## 2020-10-12 NOTE — LETTER
[facility] EMERGENCY DEPT  [address] [address]  Phone: [phone]  Fax: [phone]    October 12, 2020        Yaneli Webster  [address] [place] [id number]          To whom it may concern:    RE: Yaneli Groves was evaluated in the emergency department for a condition on 10/12/2020.  She should refrain from activity for at least the next 48 hours until results of pending test become available.     Please contact me for questions or concerns.      Sincerely,        Елена Talamantes PA-C

## 2020-10-12 NOTE — ED PROVIDER NOTES
"  History     Chief Complaint   Patient presents with     Chest Pain     chest pain since 0500, pain worse with deep breathing \" my breathing seems off \"      HPI  Yaneli Webster is a 54 year old female with past medical history significant for HTN, hyperlipidemia GERD, mild intermittent asthma, depression who presents to the emergency department wiith concern over chest pain with breathing which she noted when she awoke this morning at approximately 5 AM.  She complains of discomfort in the left upper chest which radiates to the left scapula.   Pain occurs consistently with breathing. She has had mild sore throat for the last 3 days along with nasal congestion.  She  has not had any recent fever, chills, myalgias, cough, shortness of breath, wheezing, palpitations, nausea, vomiting, diarrhea or abdominal discomfort, lower extremity edema.  She has attempted to treat with oxycodone from an old prescription as well as Tylenol and ibuprofen without relief.  She reports had a history of similar pain several years ago evaluated in outside emergency department there was diagnosis anxiety/panic attack.  She did have negative stress test at that time per her report.  She does note that she has had 2 coworkers who have tested positive for COVID-19 recently and states that she tested weekly by her place of employment, last test 10/7/10 which was negative.  She has not had any recent trauma to the chest or back.   She is a pack a day smoker, 33 pack year history.  She denies any history of bleeding or clotting disorders.  Patient is not on any birth control or hormonal therapies.  No recent surgeries or periods of prolonged inactivity.  No known cancer history.      Allergies:  Allergies   Allergen Reactions     Chantix [Varenicline] Rash     Lisinopril Cough     Adhesive Tape Rash     Medical tape. Patch for smoking.     Strattera [Atomoxetine] Rash     Zoloft [Sertraline] Rash     Problem List:    Patient Active Problem " List    Diagnosis Date Noted     Colon polyps 06/27/2017     Priority: Medium     Status post total left knee replacement 06/09/2017     Priority: Medium     Patient report surgery on 1/5/12       Depression 06/07/2017     Priority: Medium     Hyperlipidemia 06/28/2016     Priority: Medium     Osteoarthritis of knee 01/05/2012     Priority: Medium     Hypertension 01/05/2012     Priority: Medium     Tobacco use 01/05/2012     Priority: Medium      Past Medical History:    No past medical history on file.    Past Surgical History:    Past Surgical History:   Procedure Laterality Date     ARTHROSCOPY KNEE RT/LT Left 2010     ARTHROSCOPY KNEE WITH MENISCAL REPAIR Right 2017     HYSTERECTOMY, CERVIX STATUS UNKNOWN      with bladder sling     HYSTERECTOMY, PAP NO LONGER INDICATED       total knee Left 01/05/2012     TUBAL LIGATION  1989     Family History:    Family History   Problem Relation Age of Onset     Crohn's Disease Mother      Diabetes Father      Hypertension Brother      Hypertension Sister      Hypertension Brother      Hypertension Brother      Coronary Artery Disease Brother      Social History:  Marital Status:   [2]  Social History     Tobacco Use     Smoking status: Current Every Day Smoker     Packs/day: 0.50     Types: Cigarettes     Smokeless tobacco: Never Used   Substance Use Topics     Alcohol use: Yes     Comment: Occasional      Drug use: No      Medications:         atorvastatin (LIPITOR) 20 MG tablet       hydrochlorothiazide (HYDRODIURIL) 50 MG tablet       losartan (COZAAR) 100 MG tablet       metoprolol (TOPROL-XL) 25 MG 24 hr tablet       multivitamin w/minerals (MULTI-VITAMIN) tablet       naproxen (NAPROSYN) 500 MG tablet       OMEPRAZOLE PO      Review of Systems   Constitutional: Negative for chills and fever.   HENT: Positive for congestion and sore throat. Negative for ear pain.    Eyes: Negative for discharge, redness, itching and visual disturbance.   Respiratory: Negative  for cough, shortness of breath and wheezing.    Cardiovascular: Positive for chest pain.   Gastrointestinal: Negative for abdominal pain, diarrhea, nausea and vomiting.   Genitourinary: Negative for dysuria, flank pain, frequency, hematuria and urgency.   Musculoskeletal: Negative for back pain and neck pain.   Skin: Negative for color change, rash and wound.   Neurological: Negative for dizziness, syncope and light-headedness.     Physical Exam   BP: (!) 148/88  Pulse: 90  Temp: 97  F (36.1  C)  Resp: 14  Weight: 94.3 kg (208 lb)  SpO2: 98 %  Physical Exam  GENERAL APPEARANCE: healthy, alert and no distress  EYES: EOMI,  PERRL, conjunctiva clear  HENT: ear canals and TM's normal.  Nose and mouth without ulcers, erythema or lesions  NECK: supple, nontender, no lymphadenopathy  RESP: lungs clear to auscultation - no rales, rhonchi or wheezes  CV: regular rates and rhythm, normal S1 S2, no murmur noted patient does complain of improvement of pain with palpation of the anterior left upper chest wall, no focal tenderness to palpation, however pain is increased with A/P compression of the ribs.    ABDOMEN:  soft, nontender, no HSM or masses and bowel sounds normal  SKIN: no suspicious lesions or rashes   ED Course        Procedures             EKG Interpretation:      Interpreted by Елена Talaamntes PA-C and Dr. Cabrera Martel   Time reviewed: 18:50  Symptoms at time of EKG: pain with respiration   Rhythm: normal sinus   Rate: 71  Axis: Normal  Ectopy: none  Conduction: normal  ST Segments/ T Waves: negative T waves in V1 and V2  Q Waves: none  Comparison to prior: negative T waves are new from outside EKG on 3/17/17    Clinical Impression: non-specific EKG    Critical Care time:  none        Results for orders placed or performed during the hospital encounter of 10/12/20   CT Chest Pulmonary Embolism w Contrast     Status: None    Narrative    EXAM: CT CHEST PULMONARY EMBOLISM W CONTRAST  LOCATION: [facility]  SERVICES  DATE/TIME: 10/12/2020 7:34 PM    INDICATION: Chest pain.  COMPARISON: None.  TECHNIQUE: CT chest pulmonary angiogram during arterial phase injection of IV contrast. Multiplanar reformats and MIP reconstructions were performed. Dose reduction techniques were used.   CONTRAST: 85 mL Isovue 370.    FINDINGS:  ANGIOGRAM CHEST: Pulmonary arteries are normal caliber and negative for pulmonary emboli. Thoracic aorta is negative for dissection. No CT evidence of right heart strain.    LUNGS AND PLEURA: Slight scattered subpleural linear scarring and/or atelectasis. No acute infiltrates or consolidation. Indeterminate 4 mm nodule right upper lobe on series 7 image 63. Central airways are clear. No pleural effusions.    MEDIASTINUM/AXILLAE: Multiple normal-sized lymph nodes in the mediastinum. No pathologically enlarged lymphadenopathy. Normal heart size. Minimal pericardial effusion. Esophagus is grossly negative. Axillary regions are unremarkable.    UPPER ABDOMEN: Normal.    MUSCULOSKELETAL: Normal.      Impression    IMPRESSION:  1.  Negative for pulmonary embolism.    2.  Minimal pericardial effusion of uncertain significance.    3.  Negative for pneumonitis.    4.  Indeterminate 4 mm nodule right lung. Follow-up CT in one year is recommended if there are risk factors present such as history of smoking. No specific follow-up needed if no risk factors are present.       CBC with platelets differential     Status: None   Result Value Ref Range    WBC 10.4 4.0 - 11.0 10e9/L    RBC Count 4.82 3.8 - 5.2 10e12/L    Hemoglobin 14.9 11.7 - 15.7 g/dL    Hematocrit 42.7 35.0 - 47.0 %    MCV 89 78 - 100 fl    MCH 30.9 26.5 - 33.0 pg    MCHC 34.9 31.5 - 36.5 g/dL    RDW 12.1 10.0 - 15.0 %    Platelet Count 229 150 - 450 10e9/L    Diff Method Automated Method     % Neutrophils 71.2 %    % Lymphocytes 19.6 %    % Monocytes 6.1 %    % Eosinophils 2.3 %    % Basophils 0.5 %    % Immature Granulocytes 0.3 %    Nucleated RBCs 0  0 /100    Absolute Neutrophil 7.4 1.6 - 8.3 10e9/L    Absolute Lymphocytes 2.0 0.8 - 5.3 10e9/L    Absolute Monocytes 0.6 0.0 - 1.3 10e9/L    Absolute Eosinophils 0.2 0.0 - 0.7 10e9/L    Absolute Basophils 0.1 0.0 - 0.2 10e9/L    Abs Immature Granulocytes 0.0 0 - 0.4 10e9/L    Absolute Nucleated RBC 0.0    Troponin I     Status: None   Result Value Ref Range    Troponin I ES <0.015 0.000 - 0.045 ug/L   D dimer quantitative     Status: Abnormal   Result Value Ref Range    D Dimer 0.8 (H) 0.0 - 0.50 ug/ml FEU   Basic metabolic panel     Status: None   Result Value Ref Range    Sodium 137 133 - 144 mmol/L    Potassium 3.6 3.4 - 5.3 mmol/L    Chloride 102 94 - 109 mmol/L    Carbon Dioxide 28 20 - 32 mmol/L    Anion Gap 7 3 - 14 mmol/L    Glucose 99 70 - 99 mg/dL    Urea Nitrogen 13 7 - 30 mg/dL    Creatinine 0.92 0.52 - 1.04 mg/dL    GFR Estimate 71 >60 mL/min/[1.73_m2]    GFR Estimate If Black 82 >60 mL/min/[1.73_m2]    Calcium 9.2 8.5 - 10.1 mg/dL     Medications   ketorolac (TORADOL) injection 15 mg (15 mg Intravenous Given 10/12/20 1919)   iopamidol (ISOVUE-370) solution 85 mL (85 mLs Intravenous Given 10/12/20 1936)   sodium chloride 0.9 % bag 500mL for CT scan flush use (99 mLs As instructed Given 10/12/20 1936)     I spoke with cardiologist on call Dr. Morgan who recommended NSAIDS for pain control, no additional cardiology work up at this time.      Assessments & Plan (with Medical Decision Making)     I have reviewed the nursing notes.  I have reviewed the findings, diagnosis, plan and need for follow up with the patient.       Discharge Medication List as of 10/12/2020  8:47 PM        Final diagnoses:   Painful respiration   Pleural effusion   Pulmonary nodule     54-year-old female with past medical history significant for hypertension, hyperlipidemia, GERD presents to the emergency department with concern over painful respiration in the left chest which radiates to her scapula which has been present since  awakening at 5 AM this morning.  She did have elevated blood pressure upon arrival.  Remainder of vital signs within normal limits.  Physical exam findings as described above included heart which was regular rate and rhythm.  Lungs were clear to auscultation without wheezing rales or rhonchi.  As part of evaluation patient did have non-concerning CBC, BMP, troponin. D dimer was elevated at 0.8 which prompted CT of her chest which was negative for evidence of PE.  No evidence of pneumonitis.  There was incidental finding of minimal pericardial effusion of uncertain significance as well as an indeterminate 4 mm nodule in the right lung which radiologist recommended follow-up CT in 1 year if risk factors such as smoking which patient has.  I did discuss this result finding of pleural effusion with cardiologist on call Dr. Morgan who recommended NSAIDS for pain control, no further cardiac evaluation at this time.  Would consider small pleural effusion secondary to recent viral illness.  No evidence of pericarditis on EKG findings. I do not suspect MI/ACS.  She did have testing for COVID-19 pending at time of discharge.  Shew as instructed to follow up with PCP for recheck within the next week.  Worrisome reasons to return to ER sooner discussed.     Disclaimer: This note consists of symbols derived from keyboarding, dictation, and/or voice recognition software. As a result, there may be errors in the script that have gone undetected.  Please consider this when interpreting information found in the chart.    10/12/2020   [facility] [place] EMERGENCY DEPT     Елена Talamantes PA-C  10/13/20 [id number]

## 2020-10-12 NOTE — ED NOTES
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 Woke this a.m. with sharp, stabbing pain to the left anterior chest. Pain increases with inspiration. The patient put in a full day at work then came to the ED for evaluation. All VSS. Patient in no acute distress.

## 2020-10-13 LAB
SARS-COV-2 RNA SPEC QL NAA+PROBE: NOT DETECTED
SPECIMEN SOURCE: NORMAL

## 2020-10-14 ENCOUNTER — TELEPHONE (OUTPATIENT)
Dept: EMERGENCY MEDICINE | Facility: CLINIC | Age: 55
End: 2020-10-14

## 2020-10-14 NOTE — TELEPHONE ENCOUNTER
Coronavirus (COVID-19) Notification    Lab Result   Lab test 2019-nCoV rRt-PCR OR SARS-COV-2 PCR    Nasopharyngeal AND/OR Oropharyngeal swab is NEGATIVE for 2019-nCoV RNA [OR] SARS-COV-2 RNA (COVID-19) RNA    Your result was negative. This means that we didn't find the virus that causes COVID-19 in your sample. A test may show negative when you do actually have the virus. This can happen when the virus is in the early stages of infection, before you feel illness symptoms.    Address confirmed.  Letter has been sent    {Name]  Zion Fabian RN  [facility] [employer] [facility]  Emergency Dept Lab Result RN  # [phone]

## 2020-10-17 ENCOUNTER — OFFICE VISIT (OUTPATIENT)
Dept: URGENT CARE | Facility: URGENT CARE | Age: 55
End: 2020-10-17
Payer: COMMERCIAL

## 2020-10-17 VITALS
OXYGEN SATURATION: 95 % | TEMPERATURE: 99.8 F | HEART RATE: 90 BPM | SYSTOLIC BLOOD PRESSURE: 120 MMHG | DIASTOLIC BLOOD PRESSURE: 50 MMHG

## 2020-10-17 DIAGNOSIS — J02.9 SORE THROAT: Primary | ICD-10-CM

## 2020-10-17 LAB
DEPRECATED S PYO AG THROAT QL EIA: NEGATIVE
SPECIMEN SOURCE: NORMAL
SPECIMEN SOURCE: NORMAL
STREP GROUP A PCR: NOT DETECTED

## 2020-10-17 PROCEDURE — 99213 OFFICE O/P EST LOW 20 MIN: CPT | Performed by: EMERGENCY MEDICINE

## 2020-10-17 PROCEDURE — 87651 STREP A DNA AMP PROBE: CPT | Performed by: EMERGENCY MEDICINE

## 2020-10-17 RX ORDER — PREDNISONE 50 MG/1
50 TABLET ORAL DAILY
Qty: 3 TABLET | Refills: 0 | Status: SHIPPED | OUTPATIENT
Start: 2020-10-17 | End: 2020-10-20

## 2020-10-17 NOTE — PROGRESS NOTES
HPI: Patient is a 54-year-old female had a sore throat for little over a week.  Is gotten worse over the last day or 2.  No obvious strep exposure.  No obvious Covid exposure.  She gets tested weekly for Covid with a Covid test from yesterday is now pending.  She has no cough, fever, or any other cold symptoms.  No obvious strep exposure.      ROS: See HPI otherwise normal.    Allergies   Allergen Reactions     Chantix [Varenicline] Rash     Lisinopril Cough     Adhesive Tape Rash     Medical tape. Patch for smoking.     Strattera [Atomoxetine] Rash     Zoloft [Sertraline] Rash      Current Outpatient Medications   Medication Sig Dispense Refill     atorvastatin (LIPITOR) 20 MG tablet Take 1 tablet (20 mg) by mouth daily 90 tablet 3     hydrochlorothiazide (HYDRODIURIL) 50 MG tablet Take 1 tablet (50 mg) by mouth daily 30 tablet 3     metoprolol (TOPROL-XL) 25 MG 24 hr tablet Take 1 tablet (25 mg) by mouth daily 90 tablet 3     multivitamin w/minerals (MULTI-VITAMIN) tablet Take 1 tablet by mouth daily       OMEPRAZOLE PO        predniSONE (DELTASONE) 50 MG tablet Take 1 tablet (50 mg) by mouth daily for 3 days 3 tablet 0     losartan (COZAAR) 100 MG tablet Take 1 tablet (100 mg) by mouth daily (Patient not taking: Reported on 10/17/2020) 90 tablet 3     naproxen (NAPROSYN) 500 MG tablet Take 1 tablet (500 mg) by mouth 2 times daily (with meals) (Patient not taking: Reported on 10/17/2020) 60 tablet 1         PE: No acute distress.  No dysphonia.  HEENT reveals moist mucous membranes.  Posterior pharynx is erythematous.  No abscess.  Neck reveals slight tender anterior adenopathy.  Lungs are clear to auscultation.  Heart is regular.  Skin warm and dry.      TREATMENT: Rapid strep is negative      ASSESSMENT: Viral pharyngitis/URI without complicating symptoms.  Covid test is pending today.      DIAGNOSIS: Pharyngitis.  URI      PLAN: Symptomatic instructions given.  Follow-up Covid testing.  Recheck in 1 week if no  better.

## 2020-10-17 NOTE — PATIENT INSTRUCTIONS
Ice chips, popsicles, lozenges for throat pain    Follow-up on Covid test    Recheck if worse or no better in 1 week    Tylenol or Advil if pain  Patient Education     Self-Care for Sore Throats    Sore throats happen for many reasons, such as colds, allergies, and infections caused by viruses or bacteria. In any case, your throat becomes red and sore. Your goal for self-care is to reduce your discomfort while giving your throat a chance to heal.  Moisten and soothe your throat  Tips include the following:  Try a sip of water first thing after waking up.  Keep your throat moist by drinking 6 or more glasses of clear liquids every day.  Run a cool-air humidifier in your room overnight.  Avoid cigarette smoke.   Suck on throat lozenges, cough drops, hard candy, ice chips, or frozen fruit-juice bars. Use the sugar-free versions if your diet or medical condition requires them.  Gargle to ease irritation  Gargling every hour or 2 can ease irritation. Try gargling with 1 of these solutions:  1/4 teaspoon of salt in 1/2 cup of warm water  An over-the-counter anesthetic gargle  Use medicine for more relief  Over-the-counter medicine can reduce sore throat symptoms. Ask your pharmacist if you have questions about which medicine to use:  Ease pain with anesthetic sprays. Aspirin or an aspirin substitute also helps. Remember, never give aspirin to anyone 18 or younger, or if you are already taking blood thinners.   For sore throats caused by allergies, try antihistamines to block the allergic reaction.  Remember: unless a sore throat is caused by a bacterial infection, antibiotics won t help you.  Prevent future sore throats  Prevention tips include the following:  Stop smoking or reduce contact with secondhand smoke. Smoke irritates the tender throat lining.  Limit contact with pets and with allergy-causing substances, such as pollen and mold.  When you re around someone with a sore throat or cold, wash your hands often to  keep viruses or bacteria from spreading.  Don t strain your vocal cords.  Contact your healthcare provider if you have:  A temperature over 101 F (38.3 C)  White spots on the throat  Great difficulty swallowing  Trouble breathing  A skin rash  Recent exposure to someone else with strep bacteria  Severe hoarseness and swollen glands in the neck or jaw  Date Last Reviewed: 8/1/2016 2000-2019 [employer] [address] [place] [address]. All rights reserved. This information is not intended as a substitute for professional medical care. Always follow your healthcare professional's instructions.